# Patient Record
Sex: FEMALE | Race: WHITE | NOT HISPANIC OR LATINO | Employment: UNEMPLOYED | URBAN - NONMETROPOLITAN AREA
[De-identification: names, ages, dates, MRNs, and addresses within clinical notes are randomized per-mention and may not be internally consistent; named-entity substitution may affect disease eponyms.]

---

## 2018-07-20 ENCOUNTER — OFFICE VISIT CONVERTED (OUTPATIENT)
Dept: FAMILY MEDICINE CLINIC | Age: 63
End: 2018-07-20
Attending: NURSE PRACTITIONER

## 2018-08-06 ENCOUNTER — OFFICE VISIT CONVERTED (OUTPATIENT)
Dept: FAMILY MEDICINE CLINIC | Age: 63
End: 2018-08-06
Attending: NURSE PRACTITIONER

## 2018-08-07 ENCOUNTER — CONVERSION ENCOUNTER (OUTPATIENT)
Dept: OTHER | Facility: HOSPITAL | Age: 63
End: 2018-08-07

## 2019-02-25 ENCOUNTER — OFFICE VISIT CONVERTED (OUTPATIENT)
Dept: FAMILY MEDICINE CLINIC | Age: 64
End: 2019-02-25
Attending: FAMILY MEDICINE

## 2019-02-25 ENCOUNTER — HOSPITAL ENCOUNTER (OUTPATIENT)
Dept: OTHER | Facility: HOSPITAL | Age: 64
Discharge: HOME | End: 2019-02-25
Attending: FAMILY MEDICINE

## 2019-02-25 LAB
APPEARANCE UR: CLEAR
BACTERIA UR CULT: NORMAL
BACTERIA UR QL AUTO: ABNORMAL
BILIRUB UR QL: NEGATIVE
CASTS URNS QL MICRO: ABNORMAL /[LPF]
COLOR UR: YELLOW
CONV LEUKOCYTE ESTERASE: ABNORMAL
CONV UROBILINOGEN IN URINE BY AUTOMATED TEST STRIP: 0.2 {EHRLICHU}/DL (ref 0.1–1)
EPI CELLS #/AREA URNS HPF: ABNORMAL /[HPF]
GLUCOSE 24H UR-MCNC: NEGATIVE MG/DL
HGB UR QL STRIP: ABNORMAL
KETONES UR QL STRIP: NEGATIVE MG/DL
MUCOUS THREADS URNS QL MICRO: ABNORMAL
NITRITE UR-MCNC: NEGATIVE MG/ML
PH UR STRIP.AUTO: 6 [PH] (ref 5–8)
PROT UR-MCNC: 100 MG/DL
RBC # BLD AUTO: ABNORMAL /[HPF]
SP GR UR STRIP: 1.01 (ref 1–1.03)
SPECIMEN SOURCE: ABNORMAL
UNIDENT CRYS URNS QL MICRO: ABNORMAL /[HPF]
WBC #/AREA URNS HPF: ABNORMAL /[HPF]

## 2019-02-27 LAB
AMOXICILLIN+CLAV SUSC ISLT: 16
AMPICILLIN SUSC ISLT: >=32
AMPICILLIN+SULBAC SUSC ISLT: >=32
BACTERIA UR CULT: ABNORMAL
CEFAZOLIN SUSC ISLT: <=4
CEFEPIME SUSC ISLT: <=1
CEFTAZIDIME SUSC ISLT: <=1
CEFTRIAXONE SUSC ISLT: <=1
CEFUROXIME ORAL SUSC ISLT: 4
CEFUROXIME PARENTER SUSC ISLT: 4
CIPROFLOXACIN SUSC ISLT: <=0.25
ERTAPENEM SUSC ISLT: <=0.5
GENTAMICIN SUSC ISLT: <=1
LEVOFLOXACIN SUSC ISLT: <=0.12
NITROFURANTOIN SUSC ISLT: <=16
TETRACYCLINE SUSC ISLT: <=1
TMP SMX SUSC ISLT: <=20
TOBRAMYCIN SUSC ISLT: <=1

## 2019-06-26 ENCOUNTER — OFFICE VISIT CONVERTED (OUTPATIENT)
Dept: FAMILY MEDICINE CLINIC | Age: 64
End: 2019-06-26
Attending: NURSE PRACTITIONER

## 2019-09-11 ENCOUNTER — HOSPITAL ENCOUNTER (OUTPATIENT)
Dept: OTHER | Facility: HOSPITAL | Age: 64
Discharge: HOME | End: 2019-09-11
Attending: NURSE PRACTITIONER

## 2020-01-17 ENCOUNTER — HOSPITAL ENCOUNTER (OUTPATIENT)
Dept: OTHER | Facility: HOSPITAL | Age: 65
Discharge: HOME | End: 2020-01-17
Attending: NURSE PRACTITIONER

## 2020-01-17 ENCOUNTER — OFFICE VISIT CONVERTED (OUTPATIENT)
Dept: FAMILY MEDICINE CLINIC | Age: 65
End: 2020-01-17
Attending: NURSE PRACTITIONER

## 2020-01-17 LAB
ALBUMIN SERPL-MCNC: 4.3 G/DL (ref 3.5–5)
ALBUMIN/GLOB SERPL: 1.7 {RATIO} (ref 1.4–2.6)
ALP SERPL-CCNC: 83 U/L (ref 43–160)
ALT SERPL-CCNC: 9 U/L (ref 10–40)
ANION GAP SERPL CALC-SCNC: 16 MMOL/L (ref 8–19)
AST SERPL-CCNC: 15 U/L (ref 15–50)
BASOPHILS # BLD MANUAL: 0.04 10*3/UL (ref 0–0.2)
BASOPHILS NFR BLD MANUAL: 0.6 % (ref 0–3)
BILIRUB SERPL-MCNC: 0.4 MG/DL (ref 0.2–1.3)
BUN SERPL-MCNC: 11 MG/DL (ref 5–25)
BUN/CREAT SERPL: 13 {RATIO} (ref 6–20)
CALCIUM SERPL-MCNC: 9.1 MG/DL (ref 8.7–10.4)
CHLORIDE SERPL-SCNC: 101 MMOL/L (ref 99–111)
CONV CO2: 26 MMOL/L (ref 22–32)
CONV TOTAL PROTEIN: 6.8 G/DL (ref 6.3–8.2)
CREAT UR-MCNC: 0.86 MG/DL (ref 0.5–0.9)
DEPRECATED RDW RBC AUTO: 40.5 FL
EOSINOPHIL # BLD MANUAL: 0.11 10*3/UL (ref 0–0.7)
EOSINOPHIL NFR BLD MANUAL: 1.7 % (ref 0–7)
ERYTHROCYTE [DISTWIDTH] IN BLOOD BY AUTOMATED COUNT: 12.4 % (ref 11.5–14.5)
GFR SERPLBLD BASED ON 1.73 SQ M-ARVRAT: >60 ML/MIN/{1.73_M2}
GLOBULIN UR ELPH-MCNC: 2.5 G/DL (ref 2–3.5)
GLUCOSE SERPL-MCNC: 91 MG/DL (ref 65–99)
GRANS (ABSOLUTE): 4.07 10*3/UL (ref 2–8)
GRANS: 63.1 % (ref 30–85)
HBA1C MFR BLD: 13.3 G/DL (ref 12–16)
HCT VFR BLD AUTO: 40.4 % (ref 37–47)
IMM GRANULOCYTES # BLD: 0.02 10*3/UL (ref 0–0.54)
IMM GRANULOCYTES NFR BLD: 0.3 % (ref 0–0.43)
LYMPHOCYTES # BLD MANUAL: 1.85 10*3/UL (ref 1–5)
LYMPHOCYTES NFR BLD MANUAL: 5.7 % (ref 3–10)
MCH RBC QN AUTO: 29.3 PG (ref 27–31)
MCHC RBC AUTO-ENTMCNC: 32.9 G/DL (ref 33–37)
MCV RBC AUTO: 89 FL (ref 81–99)
MONOCYTES # BLD AUTO: 0.37 10*3/UL (ref 0.2–1.2)
OSMOLALITY SERPL CALC.SUM OF ELEC: 289 MOSM/KG (ref 273–304)
PLATELET # BLD AUTO: 251 10*3/UL (ref 130–400)
PMV BLD AUTO: 10.7 FL (ref 7.4–10.4)
POTASSIUM SERPL-SCNC: 3.3 MMOL/L (ref 3.5–5.3)
RBC # BLD AUTO: 4.54 10*6/UL (ref 4.2–5.4)
SODIUM SERPL-SCNC: 140 MMOL/L (ref 135–147)
T4 FREE SERPL-MCNC: 1.3 NG/DL (ref 0.9–1.8)
TSH SERPL-ACNC: 1.64 M[IU]/L (ref 0.27–4.2)
VARIANT LYMPHS NFR BLD MANUAL: 28.6 % (ref 20–45)
WBC # BLD AUTO: 6.46 10*3/UL (ref 4.8–10.8)

## 2020-01-31 ENCOUNTER — HOSPITAL ENCOUNTER (OUTPATIENT)
Dept: OTHER | Facility: HOSPITAL | Age: 65
Discharge: HOME | End: 2020-01-31
Attending: NURSE PRACTITIONER

## 2020-01-31 LAB — POTASSIUM SERPL-SCNC: 4.1 MMOL/L (ref 3.5–5.3)

## 2020-09-04 ENCOUNTER — OFFICE VISIT CONVERTED (OUTPATIENT)
Dept: FAMILY MEDICINE CLINIC | Age: 65
End: 2020-09-04
Attending: NURSE PRACTITIONER

## 2020-09-04 ENCOUNTER — HOSPITAL ENCOUNTER (OUTPATIENT)
Dept: OTHER | Facility: HOSPITAL | Age: 65
Discharge: HOME | End: 2020-09-04
Attending: NURSE PRACTITIONER

## 2020-09-06 LAB — SARS-COV-2 RNA SPEC QL NAA+PROBE: NOT DETECTED

## 2021-05-18 NOTE — PROGRESS NOTES
Lori Noe 1955     Office/Outpatient Visit    Visit Date: Mon, Aug 6, 2018 12:47 pm    Provider: Terrence Burgos N.P. (Assistant: Radha Velez LPN)    Location: [facility]        Electronically signed by Terrence Burgos N.P. on  08/06/2018 03:45:20 PM                             SUBJECTIVE:        CC:     Ms. Noe is a 62 year old White female.  right ear pain, cough, sinus drainage x 3 weeks. Pain scale 2 today with her ear.;         HPI:     Patient to be evaluated for ear pain and sinus congestion.   This has been a problem for the past 1 weeks.  This is an acute problem without chronic or recurrent episodes.  Her primary symptoms include dry cough, bilateral ear congestion,  facial pressure, subjective fever,  headache and nasal congestion.  She has already tried an antihistamine and steroid nasal spray, she took Cefdinir with improvement a week or two ago and felt better but now the symptoms have returned   Medical history is pertinent for allergies.          ROS:     CONSTITUTIONAL:  Negative for chills, fatigue, fever, and weight change.      E/N/T:  Positive for ear pain ( bilateral; feels full and feels like fluid is draining ), nasal congestion, hoarseness and sinus pressure.   Negative for sore throat.      CARDIOVASCULAR:  Negative for chest pain, orthopnea, paroxysmal nocturnal dyspnea and pedal edema.      RESPIRATORY:  Positive for recent cough ( with scant amounts of purulent sputum ).   Negative for dyspnea.      GASTROINTESTINAL:  Negative for abdominal pain, constipation, diarrhea, nausea and vomiting.      PSYCHIATRIC:  Negative for anxiety, depression, and sleep disturbances.          PMH/FM/:     Last Reviewed on 8/06/2018 03:43 PM by Terrence Burgos    Past Medical History:                 PAST MEDICAL HISTORY     UNREMARKABLE     Fracture(s): left foot 4 places 2005 bilateral wrists age 28;         PREVENTIVE HEALTH MAINTENANCE              MAMMOGRAM: was last done over 5 years with normal results needs repeat     PAP SMEAR: was last done S/P Hysterectomy age 36 with normal results No longer indicated due to age and history         Surgical History:         : X 2;     Hysterectomy: partial; age 36 has one ovary      Salivary Gland - L Neck;    Skin Cancer - Back - NOT melanoma;         Family History:         Positive for Cancer- type not specified ( mother ).  Father: Medical history unknown     Mother: Cause of death was lung and breast cancer         Social History:     Occupation: Homemaker     Marital Status:      Children: 3 children         Tobacco/Alcohol/Supplements:     Last Reviewed on 2018 03:43 PM by Terrence Burgos    Tobacco: She has a past history of cigarette smoking; quit date:  Quit  2014.   Uses E-cigs Non-drinker             Current Problems:     Last Reviewed on 2018 03:43 PM by Terrence Burgos    Nasal lesion     Low back pain     Vision problems     Screening mammogram - other     Acute sinusitis, other     Acute sinusitis, unspecified         Immunizations:     zzFluzone pf-quadrivalent 3 and up 9/10/2015     zzFluzone pf-quadrivalent 3 and up 10/6/2016     Flulaval 2011     Fluzone pf (3+ years dose) 2013     Fluzone pf (3+ years dose) 10/17/2014     Fluzone pf (3+ years dose) 10/4/2017         Allergies:     Last Reviewed on 2018 03:43 PM by Terrence Burgos      No Known Drug Allergies.         Current Medications:     Last Reviewed on 2018 03:43 PM by Terrence Burgos    Aspirin (ASA) 81mg Tablets, Enteric Coated 1 tab daily     Flonase Nasal Spray     Zyrtec 10mg Tablet         OBJECTIVE:        Vitals:         Current: 2018 12:50:25 PM    Ht:  5 ft, 6.875 in;  Wt: 140.8 lbs;  BMI: 22.1    T: 98 F (oral);  BP: 105/69 mm Hg (left arm, standing);  P: 94 bpm (left arm (BP Cuff), standing);  sCr: 0.83 mg/dL;  GFR: 71.96    O2 Sat: 98 %        Exams:     PHYSICAL  EXAM:     GENERAL: vital signs recorded - well developed, well nourished;  no apparent distress;     E/N/T: EARS: both TMs are Clear fluid behind them;  NOSE: bilateral maxillary sinus tenderness present;     NECK: range of motion is normal; thyroid is non-palpable;     RESPIRATORY: normal respiratory rate and pattern with no distress; normal breath sounds with no rales, rhonchi, wheezes or rubs;     CARDIOVASCULAR: normal rate; rhythm is regular;  no systolic murmur; no edema;     GASTROINTESTINAL: nontender; normal bowel sounds; no organomegaly;     NEUROLOGICAL:  cranial nerves, motor and sensory function, reflexes, gait and coordination are all intact;     PSYCHIATRIC:  appropriate affect and demeanor; normal speech pattern; grossly normal memory;         ASSESSMENT:           461.8   J01.90  Acute sinusitis, other              DDx:     V76.12   Z12.31  Screening mammogram - other              DDx:         ORDERS:         Meds Prescribed:       Amoxicillin 875mg Tablet One PO BID X 10 days.  #20 (Twenty) tablet(s) Refills: 0       Guaifenesin 400mg Tablets, Immediate Release 2 po BID  #40 (Forty) tablet(s) Refills: 0       Nasonex (Mometasone Furoate) 50mcg/1actuation Nasal Spray 1 spray(s) in each nostril daily  #17 (Seventeen) gm Refills: 0         Radiology/Test Orders:       [id number]  Screening mammography, bilateral (2-view film study of each breast)  (Send-Out)           Lab Orders:       APPTO  Appointment need  (In-House)                   PLAN:          Acute sinusitis, other         FOLLOW-UP: Schedule follow-up appointments on a p.r.n. basis. Schedule a follow-up visit in 3 months..            Prescriptions:       Amoxicillin 875mg Tablet One PO BID X 10 days.  #20 (Twenty) tablet(s) Refills: 0       Guaifenesin 400mg Tablets, Immediate Release 2 po BID  #40 (Forty) tablet(s) Refills: 0       Nasonex (Mometasone Furoate) 50mcg/1actuation Nasal Spray 1 spray(s) in each nostril daily  #17 (Seventeen) gm  Refills: 0           Orders:       APPTO  Appointment need  (In-House)            Screening mammogram - other         FOLLOW-UP TESTING #1:    RADIOLOGY:  I have ordered screening mammogram to be done today.            Orders:       [id number]  Screening mammography, bilateral (2-view film study of each breast)  (Send-Out)               Patient Recommendations:        For  Acute sinusitis, other:     Schedule follow-up appointments as needed. Schedule a follow-up visit in 3 months.                APPOINTMENT INFORMATION:        Monday Tuesday Wednesday Thursday Friday Saturday Sunday            Time:___________________AM  PM   Date:_____________________             CHARGE CAPTURE:           Primary Diagnosis:     461.8 Acute sinusitis, other            J01.90    Acute sinusitis, unspecified              Orders:          [id number]   Office/outpatient visit; established patient, level 3  (In-House)             APPTO   Appointment need  (In-House)           V76.12 Screening mammogram - other            Z12.31    Encounter for screening mammogram for malignant neoplasm of breast

## 2021-05-18 NOTE — PROGRESS NOTES
Lori Noe 1955     Office/Outpatient Visit    Visit Date:  10:52 am    Provider: Manuel Mcdaniel MD (Assistant: Sarah Spurling, MA)    Location: [facility]        Electronically signed by Manuel Mcdaniel MD on  2019 02:42:34 PM                             SUBJECTIVE:        CC:     Ms. Noe is a 63 year old White female.  Blood in urine. Noticed it this morning.;         HPI:         Ms. Noe presents with hematuria, unspecified .  This began yesterday.  Associated symptoms include hematuria, urgency and urinary frequency.      ROS:     CONSTITUTIONAL:  Negative for chills and fever.      GASTROINTESTINAL:  Negative for abdominal pain, constipation, diarrhea, nausea and vomiting.      GENITOURINARY:  Negative for vaginal discharge and vaginal itching.      MUSCULOSKELETAL:  Negative for myalgias.          [facility]/:     Last Reviewed on 2019 11:08 AM by Manuel Mcdaniel    Past Medical History:                 PAST MEDICAL HISTORY     UNREMARKABLE     Fracture(s): left foot 4 places 2005 bilateral wrists age 28;         PREVENTIVE HEALTH MAINTENANCE             MAMMOGRAM: Done within last 2 years and results in are chart was last done 2018 with normal results     PAP SMEAR: was last done S/P Hysterectomy age 36 with normal results No longer indicated due to age and history         Surgical History:         : X 2;     Hysterectomy: partial; age 36 has one ovary      Salivary Gland - L Neck;    Skin Cancer - Back - NOT melanoma;         Family History:         Positive for Cancer- type not specified ( mother ).  Father: Medical history unknown     Mother: Cause of death was lung and breast cancer         Social History:     Occupation: Homemaker     Marital Status:      Children: 3 children         Tobacco/Alcohol/Supplements:     Last Reviewed on 2019 11:07 AM by Manuel Mcdaniel    Tobacco: She has a past history  of cigarette smoking; quit date:  Quit  july 2014.   Uses E-cigs Non-drinker         Substance Abuse History:     Last Reviewed on 2/25/2019 11:07 AM by Manuel Mcdaniel    NEGATIVE         Mental Health History:     Last Reviewed on 8/06/2018 03:43 PM by Terrence Burgos        Communicable Diseases (eg STDs):     Last Reviewed on 8/06/2018 03:43 PM by Terrence Burgos            Current Problems:     Last Reviewed on 2/25/2019 11:09 AM by Manuel Mcdaniel    Hematuria, unspecified     Nasal lesion     Vision problems         Immunizations:     Hep A, adult dose 12/11/2018     zzFluzone pf-quadrivalent 3 and up 9/10/2015     zzFluzone pf-quadrivalent 3 and up 10/6/2016     Flulaval 9/7/2011     Fluzone pf (3+ years dose) 9/27/2013     Fluzone pf (3+ years dose) 10/17/2014     Fluzone pf (3+ years dose) 10/4/2017     Fluzone Quadrivalent (3+ years) 10/24/2018         Allergies:     Last Reviewed on 2/25/2019 11:07 AM by Manuel Mcdaniel      No Known Drug Allergies.         Current Medications:     Last Reviewed on 2/25/2019 11:08 AM by Manuel Mcdaniel    Aspirin (ASA) 81mg Tablets, Enteric Coated 1 tab daily     Flonase Nasal Spray     Zyrtec 10mg Tablet         OBJECTIVE:        Vitals:         Current: 2/25/2019 11:02:04 AM    Ht:  5 ft, 6.875 in;  Wt: 140 lbs;  BMI: 22.0    T: 97.7 F (oral);  BP: 115/70 mm Hg (left arm, standing);  P: 97 bpm (left arm (BP Cuff), standing);  sCr: 0.83 mg/dL;  GFR: 70.92        Exams:     PHYSICAL EXAM:     GENERAL: vital signs recorded - well developed, well nourished;  no apparent distress;     RESPIRATORY: normal respiratory rate and pattern with no distress; normal breath sounds with no rales, rhonchi, wheezes or rubs;     CARDIOVASCULAR: normal rate; rhythm is regular;  no systolic murmur; no edema;     GASTROINTESTINAL: nontender; normal bowel sounds; no organomegaly;     MUSCULOSKELETAL: MILD LEFT CVA TENDERNESS;     NEUROLOGIC: GROSSLY  INTACT         Lab/Test Results:             Protein urine screen:  100 (mg/dl) (02/25/2019),     blood urine:  LARGE (NA) (02/25/2019),     Leukoctyes, urine:  MODERATE (NA) (02/25/2019),     WBC, urine:  TNTC (/HPF) (02/25/2019),     RBC, urine:  TNTC (/HPF) (02/25/2019),     Bacteria:  1+ (NA) (02/25/2019),             ASSESSMENT           595.0   N30.01  Acute cystitis POSSIBLE EARLY PYELONEPHRITIS              DDx:         ORDERS:         Meds Prescribed:       Cipro (Ciprofloxacin HCl) 500mg Tablet one tab BID  #14 (Fourteen) tablet(s) Refills: 0       Pyridium (Phenazopyridine HCl) 200mg Tablet Take 1 tablet(s) by mouth tid prn  #12 (Twelve) tablet(s) Refills: 0         Lab Orders:       [id number]  [facility] - H Urinalysis, automated, with micro  (Send-Out)                   PLAN:          Acute cystitis         RECOMMENDATIONS given include: increase oral fluid intake and GO TO ER IF DEVELOPS INCRASING BACK PAIN, N/V OR WORSENING ABD PAIN.      FOLLOW-UP: Schedule follow-up appointments on a p.r.n. basis. AND BY PHONE IN A FEW DAYS.            Prescriptions:       Cipro (Ciprofloxacin HCl) 500mg Tablet one tab BID  #14 (Fourteen) tablet(s) Refills: 0       Pyridium (Phenazopyridine HCl) 200mg Tablet Take 1 tablet(s) by mouth tid prn  #12 (Twelve) tablet(s) Refills: 0           Orders:       [id number]  [facility] - [facility] Urinalysis, automated, with micro  (Send-Out)               Patient Recommendations:        For  Acute cystitis:     Increase your intake of oral fluids.  Schedule follow-up appointments as needed.              CHARGE CAPTURE           **Please note: ICD descriptions below are intended for billing purposes only and may not represent clinical diagnoses**        Primary Diagnosis:         595.0 Acute cystitis            N30.01    Acute cystitis with hematuria              Orders:          [id number]   Office/outpatient visit; established patient, level 3  (In-House)

## 2021-05-18 NOTE — PROGRESS NOTES
Lori Noe  1955     Office/Outpatient Visit    Visit Date: Fri, Sep 4, 2020 10:42 am    Provider: Marisa Syed N.P. (Assistant: Claribel Perkins LPN)    Location: [facility]        Electronically signed by Marisa Syed N.P. on  09/08/2020 09:47:48 AM                             Subjective:        CC: Ms. Noe is a 64 year old White female.  Diarrhea, nausea ears hurt;         HPI:           Complaint of nausea..  The symptom began yesterday.  nausea  started the same time her son - thought may have been from what eating           Complaint of otalgia, bilateral..  The symptom began yesterday.  This is the first episode of this type of pain.  does have chronic allergy / sinus problems using flonase and zyrtec PRN           In regard to the diarrhea, unspecified, this has been a problem for the past one to two days.  just started this morning   - thinks may have been from what she ate yesterday - onions and sour cream     ROS:     CONSTITUTIONAL:  Positive for chills.   Negative for fever.      E/N/T:  Positive for ear pain ( bilateral ), sinus pressure and sneezing.      CARDIOVASCULAR:  Negative for chest pain, orthopnea, paroxysmal nocturnal dyspnea and pedal edema.      RESPIRATORY:  Positive for recent cough.   Negative for dyspnea.      GASTROINTESTINAL:  Positive for diarrhea and nausea.   Negative for constipation or vomiting.      NEUROLOGICAL:  Negative for dizziness, headaches, paresthesias, and weakness.      PSYCHIATRIC:  Negative for anxiety, depression, and sleep disturbances.          Past Medical History / Family History / Social History:         Last Reviewed on 1/17/2020 01:20 PM by Terrence Burgos    Past Medical History:                 PAST MEDICAL HISTORY     UNREMARKABLE     Fracture(s): left foot 4 places 2005 bilateral wrists age 28;         PREVENTIVE HEALTH MAINTENANCE             COLORECTAL CANCER SCREENING: declines     MAMMOGRAM: Done  within last 2 years and results in are chart was last done 2019 with normal results     PAP SMEAR: was last done S/P Hysterectomy age 36 with normal results No longer indicated due to age and history         Surgical History:         : X 2;     Hysterectomy: partial; age 36 has one ovary     Salivary Gland - L Neck;    Skin Cancer - Back - NOT melanoma;         Family History:         Positive for Cancer- type not specified ( mother ).  Father: Medical history unknown     Mother: Cause of death was lung and breast cancer         Social History:     Occupation: Homemaker     Marital Status:      Children: 3 children         Tobacco/Alcohol/Supplements:     Last Reviewed on 2020 01:20 PM by Terrence Burgos    Tobacco: She has a past history of cigarette smoking; quit date:  Quit  2014.   Uses E-cigs Non-drinker         Substance Abuse History:     Last Reviewed on 2020 01:20 PM by Terrence Burgos    NEGATIVE         Mental Health History:     Last Reviewed on 2020 01:20 PM by Terrence Burgos        Communicable Diseases (eg STDs):     Last Reviewed on 2020 01:20 PM by Terrence Burgos        Current Problems:     Last Reviewed on 2020 01:20 PM by Terrence Burgos    Vision problems    Nasal lesion    Unspecified disorder of nose and nasal sinuses    Screening for depression    Encounter for screening for depression    Localized swelling, mass and lump, neck    Hypokalemia        Immunizations:     Hep A, adult dose 2018    Hep A, adult dose 2019    zzFluzone pf-quadrivalent 3 and up 9/10/2015    zzFluzone pf-quadrivalent 3 and up 10/6/2016    Flulaval 2011    Fluzone pf (3+ years dose) 2013    Fluzone pf (3+ years dose) 10/17/2014    Fluzone pf (3+ years dose) 10/4/2017    Fluzone Quadrivalent (3+ years) 10/24/2018    Influenza quadrivalent, recombinant, pf 10/4/2019    Boostrix (Tdap) 10/4/2019        Allergies:     Last Reviewed on  1/17/2020 01:20 PM by Terrence Burgos    No Known Allergies.        Current Medications:     Last Reviewed on 1/17/2020 01:20 PM by Terrence Burgos    Zyrtec 10mg Tablet [1 tab daily PRN]    Flonase Nasal Spray as needed    Aspirin (ASA) 81mg Tablets, Enteric Coated [1 tab daily]    Multivitamins  Tablet [1 tab daily]        Objective:        Vitals:         Current: 9/4/2020 10:45:55 AM    Ht:  5 ft, 6.875 in;  Wt: 146.6 lbs;  BMI: 23.0T: 97.1 F (oral);  BP: 121/75 mm Hg (left arm, sitting);  P: 99 bpm (left arm (BP Cuff), sitting);  sCr: 0.86 mg/dL;  GFR: 68.94        Exams:     PHYSICAL EXAM:     GENERAL: vital signs recorded - well developed, well nourished;  no apparent distress, appears minimally ill;     E/N/T: EARS: both TMs are dull;     RESPIRATORY: normal respiratory rate and pattern with no distress; normal breath sounds with no rales, rhonchi, wheezes or rubs;     CARDIOVASCULAR: normal rate; rhythm is regular;  no systolic murmur; no edema;     GASTROINTESTINAL: nontender; normal bowel sounds;     LYMPHATIC: no enlargement of cervical or facial nodes;     NEUROLOGICAL:  cranial nerves, motor and sensory function, reflexes, gait and coordination are all intact;     PSYCHIATRIC:  appropriate affect and demeanor; normal speech pattern; grossly normal memory;         Assessment:         R11.0   Nausea       H92.03   Otalgia, bilateral       R19.7   Diarrhea, unspecified           ORDERS:         Radiology/Test Orders:       [id number]  COVID 19 Testing  (Send-Out)                      Plan:         Nauseadeclines medication at this time  - follow up if worsens or symptoms change        Otalgia, bilateralfeel this is due to allergies and ETD - instructed to continue with flonase, modified valsalva to help with ETD and follow up if no improvement or worsening.          Diarrhea, unspecified    LABORATORY:  Labs ordered to be performed today include COVID 19 Testing.            Orders:       [id number]  COVID 19  Testing  (Send-Out)                  Charge Capture:         Primary Diagnosis:     R11.0  Nausea           Orders:      [id number]  Office/outpatient visit; established patient, level 3  (In-House)              H92.03  Otalgia, bilateral     R19.7  Diarrhea, unspecified

## 2021-05-18 NOTE — PROGRESS NOTES
Lori Noe 1955     Office/Outpatient Visit    Visit Date:  09:52 am    Provider: Hermelinda Olvera N.P. (Assistant: Zaynab Platt RN)    Location: [facility]        Electronically signed by Hermelinda Olvera N.P. on  2019 10:30:06 AM                             SUBJECTIVE:        CC:     Ms. Noe is a 63 year old White female.  presents today due to Pt  states that she has had sinus  pressure, green snot, and bilateral ear pressure, left worse than right, and this has been going on for about  4 days.          HPI:         Upper respiratory illness details; these have been present for the past 4 days.  The symptoms include ear complaints, headache, nasal congestion and sinus pain/pressure.  She denies fever.  She has already tried to relieve the symptoms with Tylenol.  (Not using Flonase and Zyrtec)     ROS:     CONSTITUTIONAL:  Negative for chills and fever.      EYES:  Negative for eye drainage and eye pain.      E/N/T:  Positive for ear pain, nasal congestion and sinus pressure.   Negative for sore throat.      CARDIOVASCULAR:  Negative for chest pain and palpitations.      RESPIRATORY:  Negative for dyspnea and frequent wheezing.      GASTROINTESTINAL:  Negative for abdominal pain and vomiting.          PMH/FMH/SH:     Last Reviewed on 2019 11:08 AM by Manuel Mcdaniel    Past Medical History:                 PAST MEDICAL HISTORY     UNREMARKABLE     Fracture(s): left foot 4 places 2005 bilateral wrists age 28;         PREVENTIVE HEALTH MAINTENANCE             MAMMOGRAM: Done within last 2 years and results in are chart was last done 2018 with normal results     PAP SMEAR: was last done S/P Hysterectomy age 36 with normal results No longer indicated due to age and history         Surgical History:         : X 2;     Hysterectomy: partial; age 36 has one ovary      Salivary Gland - L Neck;    Skin Cancer - Back - NOT melanoma;          Family History:         Positive for Cancer- type not specified ( mother ).  Father: Medical history unknown     Mother: Cause of death was lung and breast cancer         Social History:     Occupation: Homemaker     Marital Status:      Children: 3 children         Tobacco/Alcohol/Supplements:     Last Reviewed on 6/26/2019 10:00 AM by Zaynab Platt    Tobacco: She has a past history of cigarette smoking; quit date:  Quit  july 2014.   Uses [employer] Non-drinker         Substance Abuse History:     Last Reviewed on 2/25/2019 11:07 AM by Manuel Mcdaniel    NEGATIVE         Mental Health History:     Last Reviewed on 8/06/2018 03:43 PM by Terrence Burgos        Communicable Diseases (eg STDs):     Last Reviewed on 8/06/2018 03:43 PM by Terrence Burgos            Current Problems:     Last Reviewed on 2/25/2019 11:09 AM by Manuel Mcdaniel    Nasal lesion     Vision problems         Immunizations:     Hep A, adult dose 12/11/2018     Hep A, adult dose 6/13/2019     zzFluzone pf-quadrivalent 3 and up 9/10/2015     zzFluzone pf-quadrivalent 3 and up 10/6/2016     Flulaval 9/7/2011     Fluzone pf (3+ years dose) 9/27/2013     Fluzone pf (3+ years dose) 10/17/2014     Fluzone pf (3+ years dose) 10/4/2017     Fluzone Quadrivalent (3+ years) 10/24/2018         Allergies:     Last Reviewed on 6/26/2019 09:59 AM by Zaynab Platt      No Known Drug Allergies.         Current Medications:     Last Reviewed on 6/26/2019 10:00 AM by Zaynab Platt    Aspirin (ASA) 81mg Tablets, Enteric Coated 1 tab daily     Flonase Nasal Spray as needed     Zyrtec 10mg Tablet 1 tab daily PRN     Multivitamins Tablet 1 tab daily         OBJECTIVE:        Vitals:         Current: 6/26/2019 9:57:18 AM    Ht:  5 ft, 6.875 in;  Wt: 142.4 lbs;  BMI: 22.4    T: 97.8 F (oral);  BP: 103/67 mm Hg (left arm, standing);  P: 73 bpm (left arm (BP Cuff), standing);  sCr: 0.83 mg/dL;  GFR: 71.43        Exams:      PHYSICAL EXAM:     GENERAL: well developed, well nourished;  no apparent distress;     EYES: PERRL, EOMI     E/N/T: EARS: external auditory canal normal;  both TMs are have fluid behind them;  NOSE: turbinates are mildly swollen bilaterally;  bilateral maxillary sinus tenderness present; OROPHARYNX: oral mucosa is normal; posterior pharynx shows no exudate;     NECK: range of motion is normal; trachea is midline;     RESPIRATORY: normal respiratory rate and pattern with no distress; normal breath sounds with no rales, rhonchi, wheezes or rubs;     CARDIOVASCULAR: normal rate; rhythm is regular;     MUSCULOSKELETAL: normal gait;     NEUROLOGIC: mental status: alert and oriented x 3; GROSSLY INTACT     PSYCHIATRIC: appropriate affect and demeanor;         ASSESSMENT           465.9   J00  Upper respiratory infection              DDx:     461.8   J01.90  Acute sinusitis, other              DDx:     V79.0   Z13.31  Screening for depression              DDx:         ORDERS:         Meds Prescribed:       Amoxicillin 875mg Tablet One PO BID X 10 days.  #20 (Twenty) tablet(s) Refills: 0         Other Orders:         Depression screen negative  (In-House)           Negative EtOH screen  (In-House)                   PLAN:          Upper respiratory infection Recommend restarting allergy medication.         RECOMMENDATIONS given include: Push Fluids, Rest, Follow up if no improvement or worsening symptoms like high fevers, vomiting, weakness, or increasing shortness of air.    .      FOLLOW-UP: Chronic visit follow up          Acute sinusitis, other If no improvement with conservative treatment in time frame discussed, will start antibiotic treatment. Finish entire course if antibiotic started.           Prescriptions:       Amoxicillin 875mg Tablet One PO BID X 10 days.  #20 (Twenty) tablet(s) Refills: 0          Screening for depression     MIPS PHQ-9 Depression Screening: Completed form scanned and in chart;  Total Score 1; Negative Depression Screen Negative alcohol screen           Orders:         Depression screen negative  (In-House)           Negative EtOH screen  (In-House)               CHARGE CAPTURE           **Please note: ICD descriptions below are intended for billing purposes only and may not represent clinical diagnoses**        Primary Diagnosis:         465.9 Upper respiratory infection            J00    Acute nasopharyngitis [common cold]              Orders:          71602   Office/outpatient visit; established patient, level 3  (In-House)           461.8 Acute sinusitis, other            J01.90    Acute sinusitis, unspecified    V79.0 Screening for depression            Z13.31    Encounter for screening for depression              Orders:             Depression screen negative  (In-House)                Negative EtOH screen  (In-House)

## 2021-05-18 NOTE — PROGRESS NOTES
Lori Noe 1955     Office/Outpatient Visit    Visit Date:  09:39 am    Provider: Terrence Burgos N.P. (Assistant: Linda Cunningham MA)    Location: [facility]        Electronically signed by Terrence Burgos N.P. on  2018 11:14:25 AM                             SUBJECTIVE:        CC:     Ms. Noe is a 62 year old White female.  Earache/ Congestion;         HPI:         Patient to be evaluated for acute sinusitis, unspecified.  This has been a problem for the past 1.5 weeks.  This is an acute problem without chronic or recurrent episodes.  Her primary symptoms include dry cough, bilateral ear congestion,  facial pressure, subjective fever,  headache and nasal congestion.  She has already tried an antihistamine and steroid nasal spray.  Medical history is pertinent for allergies.      ROS:     CONSTITUTIONAL:  Positive for chills and fever.      E/N/T:  Positive for ear pain ( bilateral ), nasal congestion and sinus pressure.   Negative for sore throat.      CARDIOVASCULAR:  Negative for chest pain, orthopnea, paroxysmal nocturnal dyspnea and pedal edema.      RESPIRATORY:  Positive for recent cough ( typically dry ).   Negative for dyspnea.      GASTROINTESTINAL:  Negative for abdominal pain, constipation, diarrhea, nausea and vomiting.          PMH/FMH/SH:     Last Reviewed on 2018 10:10 AM by Terrence Burgos    Past Medical History:                 PAST MEDICAL HISTORY     UNREMARKABLE     Fracture(s): left foot 4 places  bilateral wrists age 28;         PREVENTIVE HEALTH MAINTENANCE             INFLUENZA VACCINE: was last done 2015         Surgical History:         : X 2;     Hysterectomy: partial; age 36 has one ovary      Salivary Gland - L Neck;    Skin Cancer - Back - NOT melanoma;         Family History:         Positive for Cancer- type not specified ( mother ).  Father: Medical history unknown     Mother: Cause of death  was lung and breast cancer         Social History:     Occupation: Homemaker     Marital Status:      Children: 3 children         Tobacco/Alcohol/Supplements:     Last Reviewed on 7/20/2018 10:10 AM by Terrence Burgos    Tobacco: She has a past history of cigarette smoking; quit date:  Quit  july 2014.   Uses [employer] Non-drinker             Current Problems:     Last Reviewed on 7/20/2018 10:10 AM by Terrence Burgos    Nasal lesion     Low back pain     Vision problems         Immunizations:     zzFluzone pf-quadrivalent 3 and up 9/10/2015     zzFluzone pf-quadrivalent 3 and up 10/6/2016     Flulaval 9/7/2011     Fluzone pf (3+ years dose) 9/27/2013     Fluzone pf (3+ years dose) 10/17/2014     Fluzone pf (3+ years dose) 10/4/2017         Allergies:     Last Reviewed on 7/20/2018 10:10 AM by Terrence Burgos      No Known Drug Allergies.         Current Medications:     Last Reviewed on 7/20/2018 10:10 AM by Terrence Burgos    Aspirin (ASA) 81mg Tablets, Enteric Coated 1 tab daily     Flonase Nasal Spray     Zyrtec 10mg Tablet         OBJECTIVE:        Vitals:         Current: 7/20/2018 9:39:51 AM    Ht:  5 ft, 6.875 in;  Wt: 142 lbs;  BMI: 22.3    T: 97.3 F (oral);  BP: 119/77 mm Hg (left arm, standing);  P: 82 bpm (left arm (BP Cuff), standing);  sCr: 0.83 mg/dL;  GFR: 72.22        Exams:     PHYSICAL EXAM:     GENERAL: vital signs recorded - well developed, well nourished;  no apparent distress;     E/N/T: NOSE: left maxillary and left frontal sinus tenderness present;     NECK: range of motion is normal; thyroid is non-palpable;     RESPIRATORY: normal respiratory rate and pattern with no distress; normal breath sounds with no rales, rhonchi, wheezes or rubs;     CARDIOVASCULAR: normal rate; rhythm is regular;  no systolic murmur; no edema;     GASTROINTESTINAL: nontender; normal bowel sounds; no organomegaly;         ASSESSMENT:           461.9   J01.90  Acute sinusitis, unspecified               DDx:         ORDERS:         Meds Prescribed:       Cefdinir 300mg Capsules 1 bid for 10 days  #20 (Twenty) capsule(s) Refills: 0                 PLAN:          Acute sinusitis, unspecified         FOLLOW-UP: Schedule follow-up appointments on a p.r.n. basis..            Prescriptions:       Cefdinir 300mg Capsules 1 bid for 10 days  #20 (Twenty) capsule(s) Refills: 0           Patient Education Handouts:       Sinus Infection (Sinusitis)              Patient Recommendations:        For  Acute sinusitis, unspecified:     Schedule follow-up appointments as needed.                APPOINTMENT INFORMATION:        Monday Tuesday Wednesday Thursday Friday Saturday Sunday            Time:___________________AM  PM   Date:_____________________             CHARGE CAPTURE:           Primary Diagnosis:     461.9 Acute sinusitis, unspecified            J01.90    Acute sinusitis, unspecified              Orders:          [id number]   Office/outpatient visit; established patient, level 3  (In-House)

## 2021-05-18 NOTE — PROGRESS NOTES
Lori Noe  1955     Office/Outpatient Visit    Visit Date:  10:23 am    Provider: Terrence Burgos N.P. (Assistant: Gemini Hillman MA)    Location: [facility]        Electronically signed by Terrence Burgos N.P. on  2020 01:23:06 PM                             Subjective:        CC: Ms. Noe is a 64 year old White female.  She presents with lump on throat.          HPI:       Here with lump on left side of neck ,  been there for at least one week, does not hurt, denies fever, cough, sinus pressure or pain, or recent URI. Does have hx of allergies that she takes PRN Zyrtec for, took 1/2 tablet zyrtec last pm that seems to helped some.    ROS:     CONSTITUTIONAL:  Negative for chills, fatigue, fever, and weight change.      CARDIOVASCULAR:  Negative for chest pain, orthopnea, paroxysmal nocturnal dyspnea and pedal edema.      RESPIRATORY:  Negative for dyspnea.      GASTROINTESTINAL:  Negative for abdominal pain, constipation, diarrhea, nausea and vomiting.      HEMATOLOGIC/LYMPHATIC:  Positive for Swollen area left side of neck.      PSYCHIATRIC:  Negative for anxiety, depression, and sleep disturbances.          Past Medical History / Family History / Social History:         Last Reviewed on 2020 01:20 PM by Terrence Burgos    Past Medical History:                 PAST MEDICAL HISTORY     UNREMARKABLE     Fracture(s): left foot 4 places 2005 bilateral wrists age 28;         PREVENTIVE HEALTH MAINTENANCE             MAMMOGRAM: Done within last 2 years and results in are chart was last done 2019 with normal results     PAP SMEAR: was last done S/P Hysterectomy age 36 with normal results No longer indicated due to age and history         Surgical History:         : X 2;     Hysterectomy: partial; age 36 has one ovary     Salivary Gland - L Neck;    Skin Cancer - Back - NOT melanoma;         Family History:         Positive for  Cancer- type not specified ( mother ).  Father: Medical history unknown     Mother: Cause of death was lung and breast cancer         Social History:     Occupation: Homemaker     Marital Status:      Children: 3 children         Tobacco/Alcohol/Supplements:     Last Reviewed on 1/17/2020 01:20 PM by Terrence Burgos    Tobacco: She has a past history of cigarette smoking; quit date:  Quit  july 2014.   Uses [employer] Non-drinker         Substance Abuse History:     Last Reviewed on 1/17/2020 01:20 PM by Terrence Burgos    NEGATIVE         Mental Health History:     Last Reviewed on 1/17/2020 01:20 PM by Terrence Burgos        Communicable Diseases (eg STDs):     Last Reviewed on 1/17/2020 01:20 PM by Terrence Burgos        Current Problems:     Last Reviewed on 1/17/2020 01:20 PM by Terrence Burgos    Unspecified disorder of nose and nasal sinuses    Encounter for screening for depression    Localized swelling, mass and lump, neck        Immunizations:     Hep A, adult dose 12/11/2018    Hep A, adult dose 6/13/2019    zzFluzone pf-quadrivalent 3 and up 9/10/2015    zzFluzone pf-quadrivalent 3 and up 10/6/2016    Flulaval 9/7/2011    Fluzone pf (3+ years dose) 9/27/2013    Fluzone pf (3+ years dose) 10/17/2014    Fluzone pf (3+ years dose) 10/4/2017    Fluzone Quadrivalent (3+ years) 10/24/2018    Influenza quadrivalent, recombinant, pf 10/4/2019    Boostrix (Tdap) 10/4/2019        Allergies:     Last Reviewed on 1/17/2020 01:20 PM by Terrence Burgos    No Known Allergies.        Current Medications:     Last Reviewed on 1/17/2020 01:20 PM by Terrence Burgos    Zyrtec 10mg Tablet [1 tab daily PRN]    Flonase Nasal Spray as needed    Aspirin (ASA) 81mg Tablets, Enteric Coated [1 tab daily]    Multivitamins  Tablet [1 tab daily]        Objective:        Vitals:         Current: 1/17/2020 10:31:05 AM    Ht:  5 ft, 6.875 in;  Wt: 143.8 lbs;  BMI: 22.6T: 97.5 F (oral);  BP: 111/70 mm Hg (left arm,  standing);  P: 83 bpm (left arm (BP Cuff), standing);  sCr: 0.83 mg/dL;  GFR: 70.85        Exams:     PHYSICAL EXAM:     GENERAL: vital signs recorded - well developed, well nourished;  no apparent distress;     NECK: Semi firm , mobile , nontender mass left cervical area without erythema;     RESPIRATORY: normal respiratory rate and pattern with no distress; normal breath sounds with no rales, rhonchi, wheezes or rubs;     CARDIOVASCULAR: normal rate; rhythm is regular;  no systolic murmur; no edema;     GASTROINTESTINAL: nontender; normal bowel sounds; no organomegaly;     NEUROLOGIC: GROSSLY INTACT     PSYCHIATRIC:  appropriate affect and demeanor; normal speech pattern; grossly normal memory;         Assessment:         R22.1   Localized swelling, mass and lump, neck           ORDERS:         Radiology/Test Orders:       [id number]  Radiologic examination; neck, soft tissue  (Send-Out)              Lab Orders:       [id number]  BDCB - [facility] CBC with 3 part diff  (Send-Out)            [id number]  COMP - [facility] Comp. Metabolic Panel  (Send-Out)            93220  THY - [facility] Thyroid panel with TSH ([id number], 84575)  (Send-Out)                      Plan:         Localized swelling, mass and lump, neck    LABORATORY:  Labs ordered to be performed today include CBC, Comprehensive metabolic panel, Thyroid Panel, and.      RECOMMENDATIONS given include: Further recommendation to be given after test results are complete.      FOLLOW-UP: Schedule follow-up appointments on a p.r.n. basis.:.      FOLLOW-UP TESTING #1:    RADIOLOGY:  I have ordered a neck soft tissue x-ray to be done today.            Orders:       [id number]  BDCBC - [facility] CBC with 3 part diff  (Send-Out)            [id number]  COMP - [facility] Comp. Metabolic Panel  (Send-Out)            93999  THYII - [facility] Thyroid panel with TSH ([id number], 07364)  (Send-Out)            49354  Radiologic examination; neck, soft tissue  (Send-Out)                  Patient Recommendations:        For  Localized  swelling, mass and lump, neck:    Schedule follow-up appointments as needed.                APPOINTMENT INFORMATION:        Monday Tuesday Wednesday Thursday Friday Saturday Sunday            Time:___________________AM  PM   Date:_____________________             Charge Capture:         Primary Diagnosis:     R22.1  Localized swelling, mass and lump, neck           Orders:      [id number]  Office/outpatient visit; established patient, level 3  (In-House)

## 2021-07-01 VITALS
BODY MASS INDEX: 22.1 KG/M2 | OXYGEN SATURATION: 98 % | HEIGHT: 67 IN | TEMPERATURE: 98 F | SYSTOLIC BLOOD PRESSURE: 105 MMHG | DIASTOLIC BLOOD PRESSURE: 69 MMHG | WEIGHT: 140.8 LBS | HEART RATE: 94 BPM

## 2021-07-01 VITALS
DIASTOLIC BLOOD PRESSURE: 77 MMHG | SYSTOLIC BLOOD PRESSURE: 119 MMHG | TEMPERATURE: 97.3 F | HEIGHT: 67 IN | WEIGHT: 142 LBS | BODY MASS INDEX: 22.29 KG/M2 | HEART RATE: 82 BPM

## 2021-07-01 VITALS
WEIGHT: 140 LBS | TEMPERATURE: 97.7 F | HEART RATE: 97 BPM | SYSTOLIC BLOOD PRESSURE: 115 MMHG | DIASTOLIC BLOOD PRESSURE: 70 MMHG | BODY MASS INDEX: 21.97 KG/M2 | HEIGHT: 67 IN

## 2021-07-01 VITALS
HEART RATE: 73 BPM | HEIGHT: 67 IN | DIASTOLIC BLOOD PRESSURE: 67 MMHG | BODY MASS INDEX: 22.35 KG/M2 | TEMPERATURE: 97.8 F | WEIGHT: 142.4 LBS | SYSTOLIC BLOOD PRESSURE: 103 MMHG

## 2021-07-02 VITALS
TEMPERATURE: 97.5 F | BODY MASS INDEX: 22.57 KG/M2 | DIASTOLIC BLOOD PRESSURE: 70 MMHG | WEIGHT: 143.8 LBS | SYSTOLIC BLOOD PRESSURE: 111 MMHG | HEIGHT: 67 IN | HEART RATE: 83 BPM

## 2021-07-02 VITALS
DIASTOLIC BLOOD PRESSURE: 75 MMHG | TEMPERATURE: 97.1 F | HEART RATE: 99 BPM | WEIGHT: 146.6 LBS | SYSTOLIC BLOOD PRESSURE: 121 MMHG | BODY MASS INDEX: 23.01 KG/M2 | HEIGHT: 67 IN

## 2021-09-09 ENCOUNTER — OFFICE VISIT (OUTPATIENT)
Dept: FAMILY MEDICINE CLINIC | Age: 66
End: 2021-09-09

## 2021-09-09 VITALS
DIASTOLIC BLOOD PRESSURE: 70 MMHG | SYSTOLIC BLOOD PRESSURE: 100 MMHG | HEIGHT: 67 IN | WEIGHT: 125 LBS | BODY MASS INDEX: 19.62 KG/M2 | TEMPERATURE: 97.8 F | HEART RATE: 95 BPM | OXYGEN SATURATION: 98 %

## 2021-09-09 DIAGNOSIS — J02.9 SORE THROAT: ICD-10-CM

## 2021-09-09 DIAGNOSIS — J01.10 ACUTE NON-RECURRENT FRONTAL SINUSITIS: ICD-10-CM

## 2021-09-09 DIAGNOSIS — R51.9 NONINTRACTABLE HEADACHE, UNSPECIFIED CHRONICITY PATTERN, UNSPECIFIED HEADACHE TYPE: Primary | ICD-10-CM

## 2021-09-09 PROBLEM — J30.9 ALLERGIC RHINITIS: Status: ACTIVE | Noted: 2021-09-09

## 2021-09-09 PROBLEM — E04.1 THYROID NODULE: Status: ACTIVE | Noted: 2021-09-09

## 2021-09-09 LAB
EXPIRATION DATE: NORMAL
EXPIRATION DATE: NORMAL
FLUAV AG UPPER RESP QL IA.RAPID: NOT DETECTED
FLUBV AG UPPER RESP QL IA.RAPID: NOT DETECTED
INTERNAL CONTROL: NORMAL
INTERNAL CONTROL: NORMAL
Lab: NORMAL
Lab: NORMAL
S PYO AG THROAT QL: NEGATIVE
SARS-COV-2 AG UPPER RESP QL IA.RAPID: NOT DETECTED

## 2021-09-09 PROCEDURE — 87428 SARSCOV & INF VIR A&B AG IA: CPT | Performed by: NURSE PRACTITIONER

## 2021-09-09 PROCEDURE — 99213 OFFICE O/P EST LOW 20 MIN: CPT | Performed by: NURSE PRACTITIONER

## 2021-09-09 PROCEDURE — 87880 STREP A ASSAY W/OPTIC: CPT | Performed by: NURSE PRACTITIONER

## 2021-09-09 PROCEDURE — 87081 CULTURE SCREEN ONLY: CPT | Performed by: NURSE PRACTITIONER

## 2021-09-09 RX ORDER — ASPIRIN 81 MG/1
TABLET ORAL
COMMUNITY

## 2021-09-09 RX ORDER — AMOXICILLIN AND CLAVULANATE POTASSIUM 875; 125 MG/1; MG/1
1 TABLET, FILM COATED ORAL 2 TIMES DAILY
Qty: 14 TABLET | Refills: 0 | Status: SHIPPED | OUTPATIENT
Start: 2021-09-09 | End: 2021-10-18

## 2021-09-09 RX ORDER — FLUTICASONE FUROATE 27.5 UG/1
SPRAY, METERED NASAL
COMMUNITY

## 2021-09-09 RX ORDER — CETIRIZINE HYDROCHLORIDE 10 MG/1
10 TABLET ORAL DAILY PRN
COMMUNITY

## 2021-09-09 RX ORDER — ACETAMINOPHEN 500 MG
500 TABLET ORAL EVERY MORNING
COMMUNITY

## 2021-09-09 NOTE — ASSESSMENT & PLAN NOTE
Negative for Covid flu and strep.  States this is her usual annual sinus infection.  Treat with Augmentin and continue Zyrtec and Flonase.  Follow-up if not improving.  Offered Covid PCR test if she had a high index of suspicion of having Covid or exposure to Covid.  Patient declines Covid PCR test.

## 2021-09-09 NOTE — PROGRESS NOTES
"Chief Complaint  Sore Throat (patient complains only head congestion,bilateral ear pain, sore throat, runny nose, and headache for 2-3 days ago )    Subjective  Lori is a 65-year-old female here today with 4-day complaint of nasal congestion, runny nose, sinus pain and pressure, sore throat x4 days.  She is afebrile but did feel chilled on occasion.  Denies cough, shortness of breath, loss of taste or smell, or any other symptoms.  She states this feels like her annual sinus infection.  Has been did have similar symptoms a little over 1 week ago.        Lori Noe presents to [facility]    Review of Systems   Constitutional: Positive for chills. Negative for fatigue and fever.   HENT: Positive for congestion, postnasal drip, rhinorrhea, sinus pressure and sore throat.    Respiratory: Negative.    Cardiovascular: Negative.    Gastrointestinal: Negative.         Objective   Vital Signs:   Vitals:    09/09/21 1034   BP: 100/70   BP Location: Left arm   Patient Position: Sitting   Cuff Size: Adult   Pulse: 95   Temp: 97.8 °F (36.6 °C)   TempSrc: Oral   SpO2: 98%   Weight: 56.7 kg (125 lb)   Height: 170.2 cm (67\")      Physical Exam  Vitals reviewed.   Constitutional:       General: She is not in acute distress.     Appearance: Normal appearance. She is well-developed.   HENT:      Right Ear: A middle ear effusion is present.      Left Ear: A middle ear effusion is present.      Mouth/Throat:      Mouth: Mucous membranes are moist.      Pharynx: Oropharynx is clear. No oropharyngeal exudate.   Cardiovascular:      Rate and Rhythm: Normal rate and regular rhythm.      Heart sounds: Normal heart sounds.   Pulmonary:      Effort: Pulmonary effort is normal.      Breath sounds: Normal breath sounds.   Musculoskeletal:      Right lower leg: No edema.      Left lower leg: No edema.   Lymphadenopathy:      Cervical: No cervical adenopathy.   Skin:     General: Skin is warm and dry. "   Neurological:      General: No focal deficit present.      Mental Status: She is alert.   Psychiatric:         Attention and Perception: Attention normal.         Mood and Affect: Mood and affect normal.         Behavior: Behavior normal.          Result Review :                Assessment and Plan    Diagnoses and all orders for this visit:    1. Nonintractable headache, unspecified chronicity pattern, unspecified headache type (Primary)  -     POCT rapid strep A  -     POCT SARS-CoV-2 Antigen GINA + Flu    2. Acute non-recurrent frontal sinusitis  Assessment & Plan:  Negative for Covid flu and strep.  States this is her usual annual sinus infection.  Treat with Augmentin and continue Zyrtec and Flonase.  Follow-up if not improving.  Offered Covid PCR test if she had a high index of suspicion of having Covid or exposure to Covid.  Patient declines Covid PCR test.    Orders:  -     POCT rapid strep A  -     POCT SARS-CoV-2 Antigen GINA + Flu  -     amoxicillin-clavulanate (Augmentin) 875-125 MG per tablet; Take 1 tablet by mouth 2 (Two) Times a Day.  Dispense: 14 tablet; Refill: 0    3. Sore throat  -     POCT rapid strep A  -     Beta Strep Culture, Throat - , Throat; Future  -     POCT SARS-CoV-2 Antigen GINA + Flu  -     Beta Strep Culture, Throat - Swab,      Follow Up    No follow-ups on file.  Patient was given instructions and counseling regarding her condition or for health maintenance advice. Please see specific information pulled into the AVS if appropriate.

## 2021-09-11 LAB — BACTERIA SPEC AEROBE CULT: NORMAL

## 2021-10-18 ENCOUNTER — OFFICE VISIT (OUTPATIENT)
Dept: FAMILY MEDICINE CLINIC | Age: 66
End: 2021-10-18

## 2021-10-18 VITALS
TEMPERATURE: 97.6 F | BODY MASS INDEX: 19.97 KG/M2 | HEIGHT: 67 IN | HEART RATE: 72 BPM | WEIGHT: 127.2 LBS | SYSTOLIC BLOOD PRESSURE: 113 MMHG | DIASTOLIC BLOOD PRESSURE: 69 MMHG

## 2021-10-18 DIAGNOSIS — H60.332 ACUTE SWIMMER'S EAR OF LEFT SIDE: ICD-10-CM

## 2021-10-18 DIAGNOSIS — H10.33 ACUTE CONJUNCTIVITIS OF BOTH EYES, UNSPECIFIED ACUTE CONJUNCTIVITIS TYPE: Primary | ICD-10-CM

## 2021-10-18 PROBLEM — J01.10 ACUTE NON-RECURRENT FRONTAL SINUSITIS: Status: RESOLVED | Noted: 2021-09-09 | Resolved: 2021-10-18

## 2021-10-18 PROBLEM — R51.9 NONINTRACTABLE HEADACHE: Status: RESOLVED | Noted: 2021-09-09 | Resolved: 2021-10-18

## 2021-10-18 PROBLEM — J02.9 SORE THROAT: Status: RESOLVED | Noted: 2021-09-09 | Resolved: 2021-10-18

## 2021-10-18 PROCEDURE — 99213 OFFICE O/P EST LOW 20 MIN: CPT | Performed by: FAMILY MEDICINE

## 2021-10-18 RX ORDER — NEOMYCIN SULFATE, POLYMYXIN B SULFATE AND HYDROCORTISONE 10; 3.5; 1 MG/ML; MG/ML; [USP'U]/ML
3 SUSPENSION/ DROPS AURICULAR (OTIC) 3 TIMES DAILY
Qty: 10 ML | Refills: 0 | Status: SHIPPED | OUTPATIENT
Start: 2021-10-18 | End: 2021-10-23

## 2021-10-18 RX ORDER — POLYMYXIN B SULFATE AND TRIMETHOPRIM 1; 10000 MG/ML; [USP'U]/ML
2 SOLUTION OPHTHALMIC 3 TIMES DAILY
Qty: 10 ML | Refills: 0 | Status: SHIPPED | OUTPATIENT
Start: 2021-10-18 | End: 2021-10-23

## 2021-10-18 NOTE — PROGRESS NOTES
Chief Complaint  Eye Drainage (in mornings) and Earache    Subjective          Lori Noe presents to [facility]  --BOTH EYES ITCHING AND DRAINING FOR THE PAST THREE DAYS  --LEFT EAR HAVING SHARP PAINS OFF AND ON.  USES Q-TIPS A LOT        No Known Allergies     Health Maintenance Due   Topic Date Due   • COLORECTAL CANCER SCREENING  Never done   • ANNUAL PHYSICAL  Never done   • TDAP/TD VACCINES (1 - Tdap) Never done   • ZOSTER VACCINE (1 of 2) Never done   • INFLUENZA VACCINE  08/01/2021   • HEPATITIS C SCREENING  Never done   • MAMMOGRAM  09/11/2021        Current Outpatient Medications on File Prior to Visit   Medication Sig   • acetaminophen (TYLENOL) 500 MG tablet Take 500 mg by mouth Every Morning.   • aspirin (aspirin) 81 MG EC tablet Aspir-81 81 mg oral tablet,delayed release (DR/EC) take 1 tablet (81 mg) by oral route once daily   Active   • cetirizine (ZyrTEC Allergy) 10 MG tablet Take 10 mg by mouth Daily As Needed.   • fluticasone (Flonase Sensimist) 27.5 MCG/SPRAY nasal spray Flonase Sensimist 27.5 mcg/actuation nasal spray,suspension inhale 1 puff by nasal route daily   Active   • Unable to find 1 each 1 (One) Time. Rohto Med Name 1 drop in each eye every day   • [DISCONTINUED] amoxicillin-clavulanate (Augmentin) 875-125 MG per tablet Take 1 tablet by mouth 2 (Two) Times a Day.     No current facility-administered medications on file prior to visit.       Immunization History   Administered Date(s) Administered   • COVID-19 (PFIZER) 03/24/2021, 04/14/2021   • Influenza Quad Vaccine (Inpatient) 09/27/2013, 10/04/2017       Review of Systems   Constitutional: Negative for activity change, appetite change, chills, fatigue and fever.   HENT: Negative for congestion, hearing loss, rhinorrhea and sore throat.    Eyes: Positive for discharge. Negative for blurred vision.   Respiratory: Negative for cough.    Gastrointestinal: Negative for abdominal pain,  "constipation, diarrhea, nausea and vomiting.   Musculoskeletal: Negative for arthralgias and myalgias.   Neurological: Negative for headache.        Objective     /69 (BP Location: Left arm, Patient Position: Sitting)   Pulse 72   Temp 97.6 °F (36.4 °C) (Oral)   Ht 170.2 cm (67\")   Wt 57.7 kg (127 lb 3.2 oz)   BMI 19.92 kg/m²       Physical Exam  Vitals and nursing note reviewed.   Constitutional:       General: She is not in acute distress.     Appearance: Normal appearance.   HENT:      Right Ear: Tympanic membrane normal.      Left Ear: Tympanic membrane normal.      Ears:      Comments: LEFT CANAL IS SWOLLEN AND PINNA IS TENDER TO MOVEMENT      Mouth/Throat:      Pharynx: Oropharynx is clear.   Eyes:      General:         Right eye: Discharge present.         Left eye: Discharge present.  Cardiovascular:      Rate and Rhythm: Normal rate and regular rhythm.      Heart sounds: Normal heart sounds. No murmur heard.      Pulmonary:      Effort: Pulmonary effort is normal.      Breath sounds: Normal breath sounds.   Abdominal:      Palpations: Abdomen is soft.      Tenderness: There is no abdominal tenderness.   Musculoskeletal:      Cervical back: Neck supple.      Right lower leg: No edema.      Left lower leg: No edema.   Lymphadenopathy:      Cervical: No cervical adenopathy.   Neurological:      General: No focal deficit present.      Mental Status: She is alert.      Cranial Nerves: No cranial nerve deficit.      Coordination: Coordination normal.      Gait: Gait normal.   Psychiatric:         Mood and Affect: Mood normal.         Behavior: Behavior normal.         Result Review :                             Assessment and Plan      Diagnoses and all orders for this visit:    1. Acute conjunctivitis of both eyes, unspecified acute conjunctivitis type (Primary)  Assessment & Plan:  WILL PRESCRIBE DROPS AS NOTED.  ADVISED TO SEE AN EYE DOCTOR IF NOT IMPROVED IN 3-4 DAYS    Orders:  -     " trimethoprim-polymyxin b (Polytrim) 99348-0.1 UNIT/ML-% ophthalmic solution; Administer 2 drops to both eyes 3 (Three) Times a Day for 5 days.  Dispense: 10 mL; Refill: 0    2. Acute swimmer's ear of left side  Assessment & Plan:  WILL PRESCRIBE DROPS AS NOTED.  ADVISED TO DECREASE THE USE OF Q-TIPS.  CONSIDER ENT EVAL.      Orders:  -     neomycin-polymyxin-hydrocortisone (CORTISPORIN) 3.5-76453-4 otic suspension; Administer 3 drops into the left ear 3 (Three) Times a Day for 5 days.  Dispense: 10 mL; Refill: 0          Follow Up     Return if symptoms worsen or fail to improve.    Patient was given instructions and counseling regarding her condition or for health maintenance advice. Please see specific information pulled into the AVS if appropriate.

## 2021-11-02 ENCOUNTER — FLU SHOT (OUTPATIENT)
Dept: FAMILY MEDICINE CLINIC | Age: 66
End: 2021-11-02

## 2021-11-02 DIAGNOSIS — Z23 NEED FOR INFLUENZA VACCINATION: Primary | ICD-10-CM

## 2021-11-02 PROCEDURE — 90471 IMMUNIZATION ADMIN: CPT | Performed by: FAMILY MEDICINE

## 2021-11-02 PROCEDURE — 90686 IIV4 VACC NO PRSV 0.5 ML IM: CPT | Performed by: FAMILY MEDICINE

## 2021-12-10 ENCOUNTER — OFFICE VISIT (OUTPATIENT)
Dept: FAMILY MEDICINE CLINIC | Age: 66
End: 2021-12-10

## 2021-12-10 VITALS
SYSTOLIC BLOOD PRESSURE: 101 MMHG | BODY MASS INDEX: 20.25 KG/M2 | HEART RATE: 78 BPM | WEIGHT: 129 LBS | TEMPERATURE: 98 F | DIASTOLIC BLOOD PRESSURE: 72 MMHG | HEIGHT: 67 IN | OXYGEN SATURATION: 98 %

## 2021-12-10 DIAGNOSIS — R05.9 COUGH: ICD-10-CM

## 2021-12-10 DIAGNOSIS — J01.00 ACUTE NON-RECURRENT MAXILLARY SINUSITIS: ICD-10-CM

## 2021-12-10 DIAGNOSIS — H66.90 ACUTE OTITIS MEDIA, UNSPECIFIED OTITIS MEDIA TYPE: Primary | ICD-10-CM

## 2021-12-10 LAB
EXPIRATION DATE: NORMAL
FLUAV AG UPPER RESP QL IA.RAPID: NOT DETECTED
FLUBV AG UPPER RESP QL IA.RAPID: NOT DETECTED
INTERNAL CONTROL: NORMAL
Lab: NORMAL
SARS-COV-2 AG UPPER RESP QL IA.RAPID: NOT DETECTED

## 2021-12-10 PROCEDURE — 87428 SARSCOV & INF VIR A&B AG IA: CPT | Performed by: NURSE PRACTITIONER

## 2021-12-10 PROCEDURE — 99212 OFFICE O/P EST SF 10 MIN: CPT | Performed by: NURSE PRACTITIONER

## 2021-12-10 RX ORDER — AZITHROMYCIN 250 MG/1
TABLET, FILM COATED ORAL
Qty: 6 TABLET | Refills: 0 | Status: SHIPPED | OUTPATIENT
Start: 2021-12-10 | End: 2022-05-17

## 2021-12-10 NOTE — PROGRESS NOTES
"Chief Complaint  URI, Nasal Congestion, and Cough    Subjective          Lori Noe presents to [facility]  URI   This is a new problem. The current episode started in the past 7 days. The problem has been gradually improving. There has been no fever. Associated symptoms include congestion, coughing, ear pain, headaches, rhinorrhea, sinus pain and a sore throat. She has tried acetaminophen, decongestant and antihistamine for the symptoms. The treatment provided mild relief.   Cough  This is a new problem. The current episode started in the past 7 days. The problem has been unchanged. The cough is non-productive. Associated symptoms include chills, ear congestion, ear pain, headaches, nasal congestion, rhinorrhea and a sore throat. Pertinent negatives include no fever or shortness of breath. Nothing aggravates the symptoms. She has tried nothing for the symptoms. The treatment provided mild relief.       Objective   Vital Signs:   /72 (BP Location: Right arm, Patient Position: Sitting, Cuff Size: Adult)   Pulse 78   Temp 98 °F (36.7 °C) (Oral)   Ht 170.2 cm (67\")   Wt 58.5 kg (129 lb)   SpO2 98% Comment: room air  BMI 20.20 kg/m²     Physical Exam  HENT:      Head: Normocephalic.      Right Ear: A middle ear effusion is present. Tympanic membrane is injected and erythematous.      Left Ear: A middle ear effusion is present. Tympanic membrane is erythematous.      Nose:      Right Sinus: Maxillary sinus tenderness present.      Left Sinus: Maxillary sinus tenderness present.   Cardiovascular:      Rate and Rhythm: Normal rate and regular rhythm.      Pulses: Normal pulses.      Heart sounds: Normal heart sounds.   Pulmonary:      Effort: Pulmonary effort is normal. No respiratory distress.      Breath sounds: Normal breath sounds. No stridor. No wheezing, rhonchi or rales.   Skin:     General: Skin is warm and dry.   Neurological:      Mental Status: She is alert " and oriented to person, place, and time.   Psychiatric:         Mood and Affect: Mood normal.        Result Review :                 Assessment and Plan    Diagnoses and all orders for this visit:    1. Acute otitis media, unspecified otitis media type (Primary)  -     azithromycin (Zithromax Z-Lukas) 250 MG tablet; Take 2 tablets by mouth on day 1, then 1 tablet daily on days 2-5  Dispense: 6 tablet; Refill: 0    2. Acute non-recurrent maxillary sinusitis  -     azithromycin (Zithromax Z-Lukas) 250 MG tablet; Take 2 tablets by mouth on day 1, then 1 tablet daily on days 2-5  Dispense: 6 tablet; Refill: 0    3. Cough  -     POCT SARS-CoV-2 Antigen GINA + Flu        Follow Up   Return if symptoms worsen or fail to improve.  Patient was given instructions and counseling regarding her condition or for health maintenance advice. Please see specific information pulled into the AVS if appropriate.

## 2021-12-16 ENCOUNTER — TELEPHONE (OUTPATIENT)
Dept: FAMILY MEDICINE CLINIC | Age: 66
End: 2021-12-16

## 2021-12-16 DIAGNOSIS — H66.90 ACUTE OTITIS MEDIA, UNSPECIFIED OTITIS MEDIA TYPE: Primary | ICD-10-CM

## 2021-12-16 DIAGNOSIS — J01.00 ACUTE NON-RECURRENT MAXILLARY SINUSITIS: ICD-10-CM

## 2021-12-16 DIAGNOSIS — H66.90 ACUTE OTITIS MEDIA, UNSPECIFIED OTITIS MEDIA TYPE: ICD-10-CM

## 2021-12-16 RX ORDER — GUAIFENESIN 200 MG/1
400 TABLET ORAL EVERY 4 HOURS PRN
Qty: 40 TABLET | Refills: 0 | Status: SHIPPED | OUTPATIENT
Start: 2021-12-16 | End: 2022-05-17

## 2021-12-16 RX ORDER — GUAIFENESIN 200 MG/1
400 TABLET ORAL EVERY 4 HOURS PRN
Qty: 40 TABLET | Refills: 0 | Status: SHIPPED | OUTPATIENT
Start: 2021-12-16 | End: 2021-12-16 | Stop reason: SDUPTHER

## 2021-12-16 RX ORDER — AMOXICILLIN 875 MG/1
875 TABLET, COATED ORAL 2 TIMES DAILY
Qty: 20 TABLET | Refills: 0 | Status: SHIPPED | OUTPATIENT
Start: 2021-12-16 | End: 2021-12-26

## 2021-12-16 RX ORDER — AMOXICILLIN 875 MG/1
875 TABLET, COATED ORAL 2 TIMES DAILY
Qty: 20 TABLET | Refills: 0 | Status: SHIPPED | OUTPATIENT
Start: 2021-12-16 | End: 2021-12-16 | Stop reason: SDUPTHER

## 2021-12-16 NOTE — TELEPHONE ENCOUNTER
Caller: Lori Noe    Relationship: Self    Best call back number: [phone]     Who are you requesting to speak with (clinical staff, provider,  specific staff member): CLINICAL    What was the call regarding: THE PATIENT HAS CALLED STATING THE MEDICATION SHE WAS PRESCRIBED azithromycin (Zithromax Z-Lukas) 250 MG tablet IS NOT HELPING.  SHE IS REQUESTING TO KNOW IF SHE IS NEEDING ANOTHER APPOINTMENT OR IF ANOTHER SCRIPT CAN BE CALLED IN FOR PATIENT.     Do you require a callback: YES PLEASE ADVISE.

## 2021-12-16 NOTE — TELEPHONE ENCOUNTER
Sent to pharmacy. Looking back, Amoxicillin usually works for her. I sent in some Mucinex as well. If no improvement, likely not bacterial and would recommend seeing an allergist.

## 2022-05-17 ENCOUNTER — OFFICE VISIT (OUTPATIENT)
Dept: FAMILY MEDICINE CLINIC | Age: 67
End: 2022-05-17

## 2022-05-17 ENCOUNTER — LAB (OUTPATIENT)
Dept: LAB | Facility: HOSPITAL | Age: 67
End: 2022-05-17

## 2022-05-17 VITALS
BODY MASS INDEX: 19.93 KG/M2 | OXYGEN SATURATION: 100 % | HEART RATE: 75 BPM | SYSTOLIC BLOOD PRESSURE: 108 MMHG | WEIGHT: 127 LBS | HEIGHT: 67 IN | DIASTOLIC BLOOD PRESSURE: 65 MMHG

## 2022-05-17 DIAGNOSIS — Z13.1 SCREENING FOR DIABETES MELLITUS: Primary | ICD-10-CM

## 2022-05-17 DIAGNOSIS — Z13.29 THYROID DISORDER SCREEN: ICD-10-CM

## 2022-05-17 DIAGNOSIS — Z13.0 SCREENING FOR DEFICIENCY ANEMIA: ICD-10-CM

## 2022-05-17 DIAGNOSIS — Z13.220 SCREENING CHOLESTEROL LEVEL: ICD-10-CM

## 2022-05-17 DIAGNOSIS — R23.3 EASY BRUISING: ICD-10-CM

## 2022-05-17 DIAGNOSIS — Z13.1 SCREENING FOR DIABETES MELLITUS: ICD-10-CM

## 2022-05-17 PROBLEM — H60.332 SWIMMER'S EAR OF LEFT SIDE: Status: RESOLVED | Noted: 2021-10-18 | Resolved: 2022-05-17

## 2022-05-17 PROBLEM — H10.33 ACUTE CONJUNCTIVITIS OF BOTH EYES: Status: RESOLVED | Noted: 2021-10-18 | Resolved: 2022-05-17

## 2022-05-17 LAB
ALBUMIN SERPL-MCNC: 4.5 G/DL (ref 3.5–5.2)
ALBUMIN/GLOB SERPL: 2.5 G/DL
ALP SERPL-CCNC: 67 U/L (ref 39–117)
ALT SERPL W P-5'-P-CCNC: 13 U/L (ref 1–33)
ANION GAP SERPL CALCULATED.3IONS-SCNC: 11.3 MMOL/L (ref 5–15)
AST SERPL-CCNC: 15 U/L (ref 1–32)
BASOPHILS # BLD AUTO: 0.02 10*3/MM3 (ref 0–0.2)
BASOPHILS NFR BLD AUTO: 0.4 % (ref 0–1.5)
BILIRUB SERPL-MCNC: 0.3 MG/DL (ref 0–1.2)
BUN SERPL-MCNC: 5 MG/DL (ref 8–23)
BUN/CREAT SERPL: 6.2 (ref 7–25)
CALCIUM SPEC-SCNC: 9.1 MG/DL (ref 8.6–10.5)
CHLORIDE SERPL-SCNC: 105 MMOL/L (ref 98–107)
CHOLEST SERPL-MCNC: 236 MG/DL (ref 0–200)
CO2 SERPL-SCNC: 23.7 MMOL/L (ref 22–29)
CREAT SERPL-MCNC: 0.81 MG/DL (ref 0.57–1)
DEPRECATED RDW RBC AUTO: 45.7 FL (ref 37–54)
EGFRCR SERPLBLD CKD-EPI 2021: 81.2 ML/MIN/1.73
EOSINOPHIL # BLD AUTO: 0.05 10*3/MM3 (ref 0–0.4)
EOSINOPHIL NFR BLD AUTO: 1 % (ref 0.3–6.2)
ERYTHROCYTE [DISTWIDTH] IN BLOOD BY AUTOMATED COUNT: 13.2 % (ref 12.3–15.4)
GLOBULIN UR ELPH-MCNC: 1.8 GM/DL
GLUCOSE SERPL-MCNC: 88 MG/DL (ref 65–99)
HCT VFR BLD AUTO: 38 % (ref 34–46.6)
HDLC SERPL-MCNC: 47 MG/DL (ref 40–60)
HGB BLD-MCNC: 12.2 G/DL (ref 12–15.9)
IMM GRANULOCYTES # BLD AUTO: 0 10*3/MM3 (ref 0–0.05)
IMM GRANULOCYTES NFR BLD AUTO: 0 % (ref 0–0.5)
LDLC SERPL CALC-MCNC: 172 MG/DL (ref 0–100)
LDLC/HDLC SERPL: 3.61 {RATIO}
LYMPHOCYTES # BLD AUTO: 1.28 10*3/MM3 (ref 0.7–3.1)
LYMPHOCYTES NFR BLD AUTO: 25.1 % (ref 19.6–45.3)
MCH RBC QN AUTO: 30 PG (ref 26.6–33)
MCHC RBC AUTO-ENTMCNC: 32.1 G/DL (ref 31.5–35.7)
MCV RBC AUTO: 93.6 FL (ref 79–97)
MONOCYTES # BLD AUTO: 0.32 10*3/MM3 (ref 0.1–0.9)
MONOCYTES NFR BLD AUTO: 6.3 % (ref 5–12)
NEUTROPHILS NFR BLD AUTO: 3.42 10*3/MM3 (ref 1.7–7)
NEUTROPHILS NFR BLD AUTO: 67.2 % (ref 42.7–76)
PLATELET # BLD AUTO: 230 10*3/MM3 (ref 140–450)
PMV BLD AUTO: 10.6 FL (ref 6–12)
POTASSIUM SERPL-SCNC: 3.9 MMOL/L (ref 3.5–5.2)
PROT SERPL-MCNC: 6.3 G/DL (ref 6–8.5)
RBC # BLD AUTO: 4.06 10*6/MM3 (ref 3.77–5.28)
SODIUM SERPL-SCNC: 140 MMOL/L (ref 136–145)
TRIGL SERPL-MCNC: 97 MG/DL (ref 0–150)
TSH SERPL DL<=0.05 MIU/L-ACNC: 0.68 UIU/ML (ref 0.27–4.2)
VLDLC SERPL-MCNC: 17 MG/DL (ref 5–40)
WBC NRBC COR # BLD: 5.09 10*3/MM3 (ref 3.4–10.8)

## 2022-05-17 PROCEDURE — 84443 ASSAY THYROID STIM HORMONE: CPT

## 2022-05-17 PROCEDURE — 80053 COMPREHEN METABOLIC PANEL: CPT

## 2022-05-17 PROCEDURE — 85025 COMPLETE CBC W/AUTO DIFF WBC: CPT

## 2022-05-17 PROCEDURE — 36415 COLL VENOUS BLD VENIPUNCTURE: CPT

## 2022-05-17 PROCEDURE — 80061 LIPID PANEL: CPT

## 2022-05-17 PROCEDURE — 99214 OFFICE O/P EST MOD 30 MIN: CPT | Performed by: NURSE PRACTITIONER

## 2022-05-17 NOTE — ASSESSMENT & PLAN NOTE
Further treatment pending lab results.  Could be due to daily Tylenol and low-dose aspirin.  Patient states if her cholesterol is elevated today she will not want treatment other than diet and exercise.

## 2022-05-17 NOTE — PROGRESS NOTES
"Chief Complaint  Other (Patient complains of bruising on bilateral arms since for 4 days )    Subjective  Patient is a 66-year-old female who is here today to discuss easy bruising noted this week.  She has a bruise on each lower arm with no known injury or trauma.  She does take a daily Tylenol and a baby aspirin but that is not a new regimen.  She denies fatigue, fever, or any other concerns.  She declines colon or breast cancer screening and is aware of the reasons for the testing.  She did see ear nose and throat specialist after a thyroid ultrasound in January 2020.  Nodule had dissipated at that time and no further work-up was recommended.        Lori Noe presents to [facility]          Objective   Vital Signs:   Vitals:    05/17/22 1258   BP: 108/65   BP Location: Left arm   Patient Position: Sitting   Cuff Size: Adult   Pulse: 75   SpO2: 100%   Weight: 57.6 kg (127 lb)   Height: 170.2 cm (67\")      Body mass index is 19.89 kg/m².  Physical Exam  Vitals reviewed.   Constitutional:       General: She is not in acute distress.     Appearance: Normal appearance. She is well-developed.   Neck:      Thyroid: No thyroid mass, thyromegaly or thyroid tenderness.   Cardiovascular:      Rate and Rhythm: Normal rate and regular rhythm.      Pulses:           Posterior tibial pulses are 2+ on the right side and 2+ on the left side.      Heart sounds: Normal heart sounds.   Pulmonary:      Effort: Pulmonary effort is normal.      Breath sounds: Normal breath sounds.   Musculoskeletal:      Right lower leg: No edema.      Left lower leg: No edema.   Skin:     General: Skin is warm and dry.          Neurological:      General: No focal deficit present.      Mental Status: She is alert.   Psychiatric:         Attention and Perception: Attention normal.         Mood and Affect: Mood and affect normal.         Behavior: Behavior normal.          Result Review :              "   Assessment and Plan    Diagnoses and all orders for this visit:    1. Screening for diabetes mellitus (Primary)  -     Comprehensive metabolic panel; Future    2. Screening cholesterol level  -     Lipid panel; Future    3. Screening for deficiency anemia  -     CBC w AUTO Differential; Future    4. Thyroid disorder screen  -     TSH; Future    5. Easy bruising  Assessment & Plan:  Further treatment pending lab results.  Could be due to daily Tylenol and low-dose aspirin.  Patient states if her cholesterol is elevated today she will not want treatment other than diet and exercise.        Follow Up    No follow-ups on file.  Patient was given instructions and counseling regarding her condition or for health maintenance advice. Please see specific information pulled into the AVS if appropriate.

## 2022-05-19 NOTE — PROGRESS NOTES
Normal blood sugar, kidney, liver function.  Complete blood count is normal including platelet levels.  Normal thyroid function you are not anemic.  Cholesterol does have some mild elevations.  Consume less saturated fat and fried foods and get exercise such as walking to help lower cholesterol levels.  I understand that you are not interested in taking cholesterol medication at this time.  You could consider taking omega-3 fatty acid supplement over-the-counter or red yeast rice and coenzyme every 10 over-the-counter.  I do not see anything on these labs that would concern me regarding your easy bruising.

## 2022-11-07 ENCOUNTER — CLINICAL SUPPORT (OUTPATIENT)
Dept: FAMILY MEDICINE CLINIC | Age: 67
End: 2022-11-07

## 2022-11-07 DIAGNOSIS — Z23 NEED FOR INFLUENZA VACCINATION: Primary | ICD-10-CM

## 2022-11-07 PROCEDURE — 90471 IMMUNIZATION ADMIN: CPT | Performed by: FAMILY MEDICINE

## 2022-11-07 PROCEDURE — 90686 IIV4 VACC NO PRSV 0.5 ML IM: CPT | Performed by: FAMILY MEDICINE

## 2023-02-01 ENCOUNTER — OFFICE VISIT (OUTPATIENT)
Dept: FAMILY MEDICINE CLINIC | Age: 68
End: 2023-02-01
Payer: MEDICARE

## 2023-02-01 ENCOUNTER — LAB (OUTPATIENT)
Dept: LAB | Facility: HOSPITAL | Age: 68
End: 2023-02-01
Payer: MEDICARE

## 2023-02-01 VITALS
HEIGHT: 67 IN | HEART RATE: 80 BPM | SYSTOLIC BLOOD PRESSURE: 121 MMHG | BODY MASS INDEX: 21.35 KG/M2 | DIASTOLIC BLOOD PRESSURE: 79 MMHG | WEIGHT: 136 LBS

## 2023-02-01 DIAGNOSIS — Z87.891 HISTORY OF NICOTINE DEPENDENCE: ICD-10-CM

## 2023-02-01 DIAGNOSIS — R00.2 PALPITATIONS: ICD-10-CM

## 2023-02-01 DIAGNOSIS — Z00.00 WELCOME TO MEDICARE PREVENTIVE VISIT: Primary | ICD-10-CM

## 2023-02-01 DIAGNOSIS — E78.00 HIGH CHOLESTEROL: ICD-10-CM

## 2023-02-01 DIAGNOSIS — G56.03 CARPAL TUNNEL SYNDROME, BILATERAL: ICD-10-CM

## 2023-02-01 DIAGNOSIS — Z12.11 COLON CANCER SCREENING: ICD-10-CM

## 2023-02-01 DIAGNOSIS — Z23 IMMUNIZATION DUE: ICD-10-CM

## 2023-02-01 DIAGNOSIS — R22.31 SUBCUTANEOUS NODULE OF RIGHT UPPER EXTREMITY: ICD-10-CM

## 2023-02-01 DIAGNOSIS — E04.1 NODULE OF LEFT LOBE OF THYROID GLAND: ICD-10-CM

## 2023-02-01 PROBLEM — Z53.20 MAMMOGRAM DECLINED: Status: ACTIVE | Noted: 2023-02-01

## 2023-02-01 PROBLEM — Z13.0 SCREENING FOR DEFICIENCY ANEMIA: Status: RESOLVED | Noted: 2022-05-17 | Resolved: 2023-02-01

## 2023-02-01 PROBLEM — Z13.1 SCREENING FOR DIABETES MELLITUS: Status: RESOLVED | Noted: 2022-05-17 | Resolved: 2023-02-01

## 2023-02-01 PROBLEM — Z72.0 VAPES NICOTINE CONTAINING SUBSTANCE: Status: ACTIVE | Noted: 2023-02-01

## 2023-02-01 PROBLEM — Z13.220 SCREENING CHOLESTEROL LEVEL: Status: RESOLVED | Noted: 2022-05-17 | Resolved: 2023-02-01

## 2023-02-01 PROBLEM — R23.3 EASY BRUISING: Status: RESOLVED | Noted: 2022-05-17 | Resolved: 2023-02-01

## 2023-02-01 PROBLEM — Z13.29 THYROID DISORDER SCREEN: Status: RESOLVED | Noted: 2022-05-17 | Resolved: 2023-02-01

## 2023-02-01 PROBLEM — Z28.21 VACCINATION REFUSED BY PATIENT: Status: ACTIVE | Noted: 2023-02-01

## 2023-02-01 PROBLEM — J30.89 OTHER ALLERGIC RHINITIS: Status: ACTIVE | Noted: 2021-09-09

## 2023-02-01 PROBLEM — Z53.20 COLONOSCOPY REFUSED: Status: ACTIVE | Noted: 2023-02-01

## 2023-02-01 LAB
ALBUMIN SERPL-MCNC: 4.3 G/DL (ref 3.5–5.2)
ALBUMIN/GLOB SERPL: 2 G/DL
ALP SERPL-CCNC: 75 U/L (ref 39–117)
ALT SERPL W P-5'-P-CCNC: 16 U/L (ref 1–33)
ANION GAP SERPL CALCULATED.3IONS-SCNC: 10.1 MMOL/L (ref 5–15)
AST SERPL-CCNC: 22 U/L (ref 1–32)
BILIRUB SERPL-MCNC: 0.2 MG/DL (ref 0–1.2)
BUN SERPL-MCNC: 6 MG/DL (ref 8–23)
BUN/CREAT SERPL: 7.1 (ref 7–25)
CALCIUM SPEC-SCNC: 8.9 MG/DL (ref 8.6–10.5)
CHLORIDE SERPL-SCNC: 103 MMOL/L (ref 98–107)
CHOLEST SERPL-MCNC: 262 MG/DL (ref 0–200)
CO2 SERPL-SCNC: 28.9 MMOL/L (ref 22–29)
CREAT SERPL-MCNC: 0.85 MG/DL (ref 0.57–1)
DEPRECATED RDW RBC AUTO: 43.6 FL (ref 37–54)
EGFRCR SERPLBLD CKD-EPI 2021: 76.1 ML/MIN/1.73
ERYTHROCYTE [DISTWIDTH] IN BLOOD BY AUTOMATED COUNT: 12.8 % (ref 12.3–15.4)
GLOBULIN UR ELPH-MCNC: 2.1 GM/DL
GLUCOSE SERPL-MCNC: 95 MG/DL (ref 65–99)
HCT VFR BLD AUTO: 39.4 % (ref 34–46.6)
HDLC SERPL-MCNC: 42 MG/DL (ref 40–60)
HGB BLD-MCNC: 13 G/DL (ref 12–15.9)
LDLC SERPL CALC-MCNC: 185 MG/DL (ref 0–100)
LDLC/HDLC SERPL: 4.35 {RATIO}
MCH RBC QN AUTO: 30 PG (ref 26.6–33)
MCHC RBC AUTO-ENTMCNC: 33 G/DL (ref 31.5–35.7)
MCV RBC AUTO: 91 FL (ref 79–97)
PLATELET # BLD AUTO: 248 10*3/MM3 (ref 140–450)
PMV BLD AUTO: 10.3 FL (ref 6–12)
POTASSIUM SERPL-SCNC: 3.9 MMOL/L (ref 3.5–5.2)
PROT SERPL-MCNC: 6.4 G/DL (ref 6–8.5)
RBC # BLD AUTO: 4.33 10*6/MM3 (ref 3.77–5.28)
SODIUM SERPL-SCNC: 142 MMOL/L (ref 136–145)
TRIGL SERPL-MCNC: 187 MG/DL (ref 0–150)
TSH SERPL DL<=0.05 MIU/L-ACNC: 1.17 UIU/ML (ref 0.27–4.2)
VLDLC SERPL-MCNC: 35 MG/DL (ref 5–40)
WBC NRBC COR # BLD: 4.77 10*3/MM3 (ref 3.4–10.8)

## 2023-02-01 PROCEDURE — 99214 OFFICE O/P EST MOD 30 MIN: CPT | Performed by: FAMILY MEDICINE

## 2023-02-01 PROCEDURE — 80053 COMPREHEN METABOLIC PANEL: CPT | Performed by: FAMILY MEDICINE

## 2023-02-01 PROCEDURE — 84443 ASSAY THYROID STIM HORMONE: CPT | Performed by: FAMILY MEDICINE

## 2023-02-01 PROCEDURE — 1159F MED LIST DOCD IN RCRD: CPT | Performed by: FAMILY MEDICINE

## 2023-02-01 PROCEDURE — 1170F FXNL STATUS ASSESSED: CPT | Performed by: FAMILY MEDICINE

## 2023-02-01 PROCEDURE — 85027 COMPLETE CBC AUTOMATED: CPT | Performed by: FAMILY MEDICINE

## 2023-02-01 PROCEDURE — G0009 ADMIN PNEUMOCOCCAL VACCINE: HCPCS | Performed by: FAMILY MEDICINE

## 2023-02-01 PROCEDURE — 80061 LIPID PANEL: CPT | Performed by: FAMILY MEDICINE

## 2023-02-01 PROCEDURE — 90677 PCV20 VACCINE IM: CPT | Performed by: FAMILY MEDICINE

## 2023-02-01 PROCEDURE — G0402 INITIAL PREVENTIVE EXAM: HCPCS | Performed by: FAMILY MEDICINE

## 2023-02-01 PROCEDURE — 36415 COLL VENOUS BLD VENIPUNCTURE: CPT | Performed by: FAMILY MEDICINE

## 2023-02-01 NOTE — PROGRESS NOTES
"The ABCs of the Annual Wellness Visit  Subsequent Medicare Wellness Visit    Subjective    Lori Noe is a 67 y.o. female who presents for a Subsequent Medicare Wellness Visit.    The following portions of the patient's history were reviewed and   updated as appropriate: {history reviewed:20406::\"allergies\",\"current medications\",\"past family history\",\"past medical history\",\"past social history\",\"past surgical history\",\"problem list\"}.    Compared to one year ago, the patient feels her physical   health is {better worse same:45200}.    Compared to one year ago, the patient feels her mental   health is {better worse same:78766}.    Recent Hospitalizations:  {Hospital Admission Status in the last 365 days:78367}      Current Medical Providers:  Patient Care Team:  Manuel Mcdaniel MD as PCP - General (Family Medicine)    Outpatient Medications Prior to Visit   Medication Sig Dispense Refill   • acetaminophen (TYLENOL) 500 MG tablet Take 500 mg by mouth Every Morning.     • aspirin 81 MG EC tablet Aspir-81 81 mg oral tablet,delayed release (DR/EC) take 1 tablet (81 mg) by oral route once daily   Active     • cetirizine (zyrTEC) 10 MG tablet Take 10 mg by mouth Daily As Needed.     • fluticasone (Flonase Sensimist) 27.5 MCG/SPRAY nasal spray Flonase Sensimist 27.5 mcg/actuation nasal spray,suspension inhale 1 puff by nasal route daily   Active     • Unable to find 1 each 1 (One) Time. Rohto Med Name 1 drop in each eye every day       No facility-administered medications prior to visit.       No opioid medication identified on active medication list. I have reviewed chart for other potential  high risk medication/s and harmful drug interactions in the elderly.          Aspirin is on active medication list. {ASPIRIN ON MEDICATION LIST INDICATED/NOT INDICATED:31715}.      Patient Active Problem List   Diagnosis   • Allergic rhinitis   • Thyroid nodule   • Easy bruising   • Thyroid disorder screen   • " "Screening for deficiency anemia   • Screening cholesterol level   • Screening for diabetes mellitus     Advance Care Planning  Advance Directive is not on file.  {ACP Discussion, Advance Directive not in EMR:94668}     Objective    Vitals:    02/01/23 1320   BP: 121/79   BP Location: Left arm   Patient Position: Sitting   Pulse: 80   Weight: 61.7 kg (136 lb)   Height: 170.2 cm (67\")     Estimated body mass index is 21.3 kg/m² as calculated from the following:    Height as of this encounter: 170.2 cm (67\").    Weight as of this encounter: 61.7 kg (136 lb).    BMI is within normal parameters. No other follow-up for BMI required.      Does the patient have evidence of cognitive impairment? {Yes/No:59508}          HEALTH RISK ASSESSMENT    Smoking Status:  Social History     Tobacco Use   Smoking Status Former   • Packs/day: 1.00   • Years: 20.00   • Pack years: 20.00   • Types: Cigarettes   • Quit date: 2/1/2013   • Years since quitting: 10.0   Smokeless Tobacco Never     Alcohol Consumption:  Social History     Substance and Sexual Activity   Alcohol Use Not Currently     Fall Risk Screen:    STEADI Fall Risk Assessment was completed, and patient is at LOW risk for falls.Assessment completed on:2/1/2023    Depression Screening:  PHQ-2/PHQ-9 Depression Screening 2/1/2023   Little Interest or Pleasure in Doing Things 0-->not at all   Feeling Down, Depressed or Hopeless 0-->not at all   PHQ-9: Brief Depression Severity Measure Score 0       Health Habits and Functional and Cognitive Screening:  Functional & Cognitive Status 2/1/2023   Do you have difficulty preparing food and eating? No   Do you have difficulty bathing yourself, getting dressed or grooming yourself? No   Do you have difficulty using the toilet? No   Do you have difficulty moving around from place to place? No   Do you have trouble with steps or getting out of a bed or a chair? No   Current Diet Well Balanced Diet   Dental Exam Up to date   Eye Exam Up to " date   Exercise (times per week) 0 times per week   Current Exercises Include No Regular Exercise   Do you need help using the phone?  No   Are you deaf or do you have serious difficulty hearing?  No   Do you need help with transportation? No   Do you need help shopping? No   Do you need help preparing meals?  No   Do you need help with housework?  No   Do you need help with laundry? No   Do you need help taking your medications? No   Do you need help managing money? No   Do you ever drive or ride in a car without wearing a seat belt? No   Have you felt unusual stress, anger or loneliness in the last month? Yes   Who do you live with? Spouse   If you need help, do you have trouble finding someone available to you? No   Have you been bothered in the last four weeks by sexual problems? No   Do you have difficulty concentrating, remembering or making decisions? No       Age-appropriate Screening Schedule:  Refer to the list below for future screening recommendations based on patient's age, sex and/or medical conditions. Orders for these recommended tests are listed in the plan section. The patient has been provided with a written plan.    Health Maintenance   Topic Date Due   • DXA SCAN  Never done   • TDAP/TD VACCINES (1 - Tdap) Never done   • ZOSTER VACCINE (1 of 2) Never done   • MAMMOGRAM  05/17/2023 (Originally 9/11/2021)   • INFLUENZA VACCINE  Completed                CMS Preventative Services Quick Reference  Risk Factors Identified During Encounter  {Medicare Wellness Risk Factors:52584}  The above risks/problems have been discussed with the patient.  Pertinent information has been shared with the patient in the After Visit Summary.  An After Visit Summary and PPPS were made available to the patient.    Follow Up:   Next Medicare Wellness visit to be scheduled in 1 year.   {Wrapup  Review (Popup)  Advance Care Planning  Labs  CC  Problem List  Visit Diagnosis  Medications  Result Review  Imaging   "Health Maintenance  Quality  BestPractice  SmartSets  SnapShot  Encounters  Notes  Media  Procedures :23}    Additional E&M Note during same encounter follows:  Patient has multiple medical problems which are significant and separately identifiable that require additional work above and beyond the Medicare Wellness Visit.      Chief Complaint  Medicare Wellness-Initial Visit    Subjective    {Problem List  Visit Diagnosis   Encounters  Notes  Medications  Labs  Result Review Imaging  Media :23}    VAPE    Y  NICOTINE    CT   SCHED    NODULE   R IS    CHOL  REPEAT   DECLINES MEDS     COL    COLOGARD    P 20   SHINGLES    YES AND PHARMACY    WRISTS   CTS   R NODULE    PALPITATIONS OCC WITH ANXIETY    NO MOR MAMMOGRAMS    Lori Noe is also being seen today for ***         Objective   Vital Signs:  /79 (BP Location: Left arm, Patient Position: Sitting)   Pulse 80   Ht 170.2 cm (67\")   Wt 61.7 kg (136 lb)   BMI 21.30 kg/m²     Physical Exam     {The following data was reviewed by (Optional):68932}  {Ambulatory Labs (Optional):16690}  {Data reviewed (Optional):77809:::1}           Assessment and Plan {CC Problem List  Visit Diagnosis   ROS  Review (Popup)  [facility]  Quality  BestPractice  Medications  SmartSets  SnapShot Encounters  Media :23}  There are no diagnoses linked to this encounter.       {Time Spent (Optional):76261}  Follow Up {Instructions Charge Capture  Follow-up Communications :23}  No follow-ups on file.  Patient was given instructions and counseling regarding her condition or for health maintenance advice. Please see specific information pulled into the AVS if appropriate.         "

## 2023-02-01 NOTE — ASSESSMENT & PLAN NOTE
Lipid abnormalities are newly identified.  Nutritional counseling was provided.  Lipids will be reassessed in 1 year.

## 2023-02-01 NOTE — ASSESSMENT & PLAN NOTE
ADVICE GIVEN RE:  SEATBELT USE, ALCOHOL USE, HEALTHY DIET, ROUTINE EYE AND DENTAL EXAM, ROUTINE VACCINATIONS.    WILL GIVE A PREVNAR-20 AND SHE WILL CHECK WITH HER PHARMACY ABOUT A SHINGRX  WILL ARRANGE A CHEST CT AND A COLOGARD

## 2023-02-01 NOTE — PROGRESS NOTES
The ABCs of the Annual Wellness Visit  Revelo to Medicare Visit    Subjective     Lori Noe is a 67 y.o. female who presents for a  Welcome to Medicare Visit.    The following portions of the patient's history were reviewed and   updated as appropriate: allergies, current medications, past family history, past medical history, past social history, past surgical history and problem list.     Compared to one year ago, the patient feels her physical   health is the same.    Compared to one year ago, the patient feels her mental   health is the same.    Recent Hospitalizations:  She was not admitted to the hospital during the last year.       Current Medical Providers:  Patient Care Team:  Manuel Mcdaniel MD as PCP - General (Family Medicine)    Outpatient Medications Prior to Visit   Medication Sig Dispense Refill   • acetaminophen (TYLENOL) 500 MG tablet Take 500 mg by mouth Every Morning.     • aspirin 81 MG EC tablet Aspir-81 81 mg oral tablet,delayed release (DR/EC) take 1 tablet (81 mg) by oral route once daily   Active     • cetirizine (zyrTEC) 10 MG tablet Take 10 mg by mouth Daily As Needed.     • fluticasone (Flonase Sensimist) 27.5 MCG/SPRAY nasal spray Flonase Sensimist 27.5 mcg/actuation nasal spray,suspension inhale 1 puff by nasal route daily   Active     • Unable to find 1 each 1 (One) Time. [facility] Med Name 1 drop in each eye every day       No facility-administered medications prior to visit.       No opioid medication identified on active medication list. I have reviewed chart for other potential  high risk medication/s and harmful drug interactions in the elderly.          Aspirin is on active medication list. Aspirin use is indicated based on review of current medical condition/s. Pros and cons of this therapy have been discussed today. Benefits of this medication outweigh potential harm.  Patient has been encouraged to continue taking this medication.  .      Patient Active  "Problem List   Diagnosis   • Other allergic rhinitis   • High cholesterol (declines medication)    • Palpitations (occasional, brief)    • Carpal tunnel syndrome, bilateral   • Welcome to Medicare preventive visit   • Colonoscopy declined (Cologard in 2023)    • Mammogram declined   • Subcutaneous nodule of right upper extremity   • Nodule of left lobe of thyroid gland   • Further Covid vaccinations declined by patient     Advance Care Planning  Advance Directive is not on file.  ACP discussion was held with the patient during this visit. Patient has an advance directive (not in EMR), copy requested.       Objective   Vitals:    02/01/23 1320   BP: 121/79   BP Location: Left arm   Patient Position: Sitting   Pulse: 80   Weight: 61.7 kg (136 lb)   Height: 170.2 cm (67\")     Estimated body mass index is 21.3 kg/m² as calculated from the following:    Height as of this encounter: 170.2 cm (67\").    Weight as of this encounter: 61.7 kg (136 lb).    BMI is within normal parameters. No other follow-up for BMI required.      Does the patient have evidence of cognitive impairment?   No         Procedures       HEALTH RISK ASSESSMENT    Smoking Status:  Social History     Tobacco Use   Smoking Status Former   • Packs/day: 1.00   • Years: 20.00   • Pack years: 20.00   • Types: Cigarettes   • Quit date: 2/1/2013   • Years since quitting: 10.0   Smokeless Tobacco Never     Alcohol Consumption:  Social History     Substance and Sexual Activity   Alcohol Use Not Currently       Fall Risk Screen:    JUSTIN Fall Risk Assessment was completed, and patient is at LOW risk for falls.Assessment completed on:2/1/2023    Depression Screen:   PHQ-2/PHQ-9 Depression Screening 2/1/2023   Little Interest or Pleasure in Doing Things 0-->not at all   Feeling Down, Depressed or Hopeless 0-->not at all   PHQ-9: Brief Depression Severity Measure Score 0       Health Habits and Functional and Cognitive Screening:  Functional & Cognitive Status " 2/1/2023   Do you have difficulty preparing food and eating? No   Do you have difficulty bathing yourself, getting dressed or grooming yourself? No   Do you have difficulty using the toilet? No   Do you have difficulty moving around from place to place? No   Do you have trouble with steps or getting out of a bed or a chair? No   Current Diet Well Balanced Diet   Dental Exam Up to date   Eye Exam Up to date   Exercise (times per week) 0 times per week   Current Exercises Include No Regular Exercise   Do you need help using the phone?  No   Are you deaf or do you have serious difficulty hearing?  No   Do you need help with transportation? No   Do you need help shopping? No   Do you need help preparing meals?  No   Do you need help with housework?  No   Do you need help with laundry? No   Do you need help taking your medications? No   Do you need help managing money? No   Do you ever drive or ride in a car without wearing a seat belt? No   Have you felt unusual stress, anger or loneliness in the last month? Yes   Who do you live with? Spouse   If you need help, do you have trouble finding someone available to you? No   Have you been bothered in the last four weeks by sexual problems? No   Do you have difficulty concentrating, remembering or making decisions? No       Visual Acuity:    No results found.    Age-appropriate Screening Schedule:  Refer to the list below for future screening recommendations based on patient's age, sex and/or medical conditions. Orders for these recommended tests are listed in the plan section. The patient has been provided with a written plan.    Health Maintenance   Topic Date Due   • DXA SCAN  Never done   • TDAP/TD VACCINES (1 - Tdap) Never done   • ZOSTER VACCINE (1 of 2) Never done   • MAMMOGRAM  05/17/2023 (Originally 9/11/2021)   • LIPID PANEL  05/17/2023   • INFLUENZA VACCINE  Completed        CMS Preventative Services Quick Reference  Risk Factors Identified During Encounter    None  "Identified  The above risks/problems have been discussed with the patient.  Pertinent information has been shared with the patient in the After Visit Summary.    Follow Up:   Initial Medicare Visit in one year    An After Visit Summary and PPPS were made available to the patient.      Additional E&M Note during same encounter follows:  Patient has multiple medical problems which are significant and separately identifiable that require additional work above and beyond the Medicare Wellness Visit.      Chief Complaint  Medicare Wellness-Initial Visit    Subjective        --LAST CHOL WAS > 200, HAS BEEN EXERCISING AND DIETING  --OCCASIONAL EPISODES OF ANXIETY ACCOMPANIED BY PALPITATIONS, NO OTHER ANGINAL SYMPTOMS, NO SYMPTOMS WITHOUT ALSO FEELING ANXIOUS  --BILATERAL WRIST PAIN WITH NUMBNESS / TINGLING IN THE FINGERS S/P PRIOR FRACTURES, ALSO HAS A NODULE ON THE RIGHT WRIST  --LEFT THYROID NODULE PER U/S IN 2019  --DUE FOR A PREVNAR-20, SHINGRX, SCREENING CHEST CT AND COLON CANCER SCREENING       Lori Noe is also being seen today for WRIST PAIN, PALPITATIONS    Review of Systems   Constitutional: Negative for activity change, appetite change, chills, fatigue and fever.   HENT: Negative for congestion, ear pain, hearing loss, rhinorrhea and sore throat.    Eyes: Negative for discharge and visual disturbance.   Respiratory: Negative for cough and shortness of breath.    Cardiovascular: Negative for chest pain and leg swelling.   Gastrointestinal: Negative for abdominal pain, diarrhea, nausea and vomiting.   Genitourinary: Negative for dysuria and hematuria.   Musculoskeletal: Negative for arthralgias and myalgias.   Psychiatric/Behavioral: Negative for dysphoric mood.       Objective   Vital Signs:  /79 (BP Location: Left arm, Patient Position: Sitting)   Pulse 80   Ht 170.2 cm (67\")   Wt 61.7 kg (136 lb)   BMI 21.30 kg/m²     Physical Exam  Vitals and nursing note reviewed.   Constitutional:       " General: She is not in acute distress.     Appearance: Normal appearance.   HENT:      Right Ear: Tympanic membrane normal.      Left Ear: Tympanic membrane normal.      Mouth/Throat:      Pharynx: Oropharynx is clear.   Eyes:      Conjunctiva/sclera: Conjunctivae normal.   Cardiovascular:      Rate and Rhythm: Normal rate and regular rhythm.      Pulses: Normal pulses.      Heart sounds: Normal heart sounds. No murmur heard.  Pulmonary:      Effort: Pulmonary effort is normal.      Breath sounds: Normal breath sounds.   Abdominal:      General: Bowel sounds are normal.      Palpations: Abdomen is soft.      Tenderness: There is no abdominal tenderness.   Musculoskeletal:      Cervical back: Neck supple.      Right lower leg: No edema.      Left lower leg: No edema.      Comments: BOTH WRISTS WITH GOOD ROM.  ONE X ONE CM FREELY MOBILE SUB-Q NODULE ON THE EXTENSOR SURFACE ON THE RIGHT WRIST     Lymphadenopathy:      Cervical: No cervical adenopathy.   Neurological:      General: No focal deficit present.      Mental Status: She is alert.      Cranial Nerves: No cranial nerve deficit.      Coordination: Coordination normal.      Gait: Gait normal.   Psychiatric:         Mood and Affect: Mood normal.         Behavior: Behavior normal.                      Assessment and Plan   Diagnoses and all orders for this visit:    1. Welcome to Medicare preventive visit (Primary)  Assessment & Plan:  ADVICE GIVEN RE:  SEATBELT USE, ALCOHOL USE, HEALTHY DIET, ROUTINE EYE AND DENTAL EXAM, ROUTINE VACCINATIONS.    WILL GIVE A PREVNAR-20 AND SHE WILL CHECK WITH HER PHARMACY ABOUT A SHINGRX  WILL ARRANGE A CHEST CT AND A COLOGARD       2. High cholesterol  Assessment & Plan:  Lipid abnormalities are newly identified.  Nutritional counseling was provided.  Lipids will be reassessed in 1 year.    Orders:  -     Comprehensive Metabolic Panel  -     Lipid Panel    3. Palpitations  Assessment & Plan:  LABS AS NOTED, OBSERVE FOR NOW BUT  CONSIDER HOLTER, ECHO, CARDIOLOGY EVAL    Orders:  -     CBC (No Diff)  -     TSH Rfx On Abnormal To Free T4    4. Immunization due  -     Pneumococcal Conjugate Vaccine 20-Valent All    5. Carpal tunnel syndrome, bilateral  -     Ambulatory Referral to Hand Surgery    6. Colon cancer screening  -     Cologuard - Stool, Per Rectum; Future    7. History of nicotine dependence  -     CT Chest Low Dose Wo; Future    8. Nodule of left lobe of thyroid gland  -     US Thyroid; Future    9. Subcutaneous nodule of right upper extremity  -     Ambulatory Referral to Hand Surgery           Follow Up   Return in about 1 year (around 2/1/2024).  Patient was given instructions and counseling regarding her condition or for health maintenance advice. Please see specific information pulled into the AVS if appropriate.

## 2023-02-14 ENCOUNTER — HOSPITAL ENCOUNTER (OUTPATIENT)
Dept: ULTRASOUND IMAGING | Facility: HOSPITAL | Age: 68
Discharge: HOME | End: 2023-02-14
Payer: MEDICARE

## 2023-02-14 ENCOUNTER — HOSPITAL ENCOUNTER (OUTPATIENT)
Dept: CT IMAGING | Facility: HOSPITAL | Age: 68
Discharge: HOME | End: 2023-02-14
Payer: MEDICARE

## 2023-02-14 DIAGNOSIS — E04.1 NODULE OF LEFT LOBE OF THYROID GLAND: ICD-10-CM

## 2023-02-14 DIAGNOSIS — Z87.891 HISTORY OF NICOTINE DEPENDENCE: ICD-10-CM

## 2023-02-14 PROCEDURE — 76536 US EXAM OF HEAD AND NECK: CPT

## 2023-02-14 PROCEDURE — 71271 CT THORAX LUNG CANCER SCR C-: CPT

## 2023-06-07 ENCOUNTER — TELEPHONE (OUTPATIENT)
Dept: FAMILY MEDICINE CLINIC | Age: 68
End: 2023-06-07
Payer: MEDICARE

## 2023-06-07 NOTE — TELEPHONE ENCOUNTER
Received a order for a EKG to be done for [facility].  Fax results to them at [phone].     Dr Mcdaniel is it okay to do these orders?

## 2023-06-07 NOTE — TELEPHONE ENCOUNTER
Caller: Lori Noe    Relationship: Self    Best call back number: [phone]     What is the best time to reach you: ANYTIME     Who are you requesting to speak with (clinical staff, provider,  specific staff member): CLINICAL         What was the call regarding: PATIENT IS CALLING TO CHECK THE STATUS OF THE ABOVE ENCOUNTER, APOLOGIES IN ADVANCE Radha Velez LPN , TO ADD NOTATION TO SIGNED ENCOUNTER. PATIENT IS ADVISED A MESSAGE WAS SENT BACK IN REGARDS TO THE ORDER.

## 2023-06-09 ENCOUNTER — TRANSCRIBE ORDERS (OUTPATIENT)
Dept: FAMILY MEDICINE CLINIC | Age: 68
End: 2023-06-09
Payer: MEDICARE

## 2023-06-09 DIAGNOSIS — I10 HYPERTENSION, UNSPECIFIED TYPE: Primary | ICD-10-CM

## 2023-06-16 ENCOUNTER — CLINICAL SUPPORT (OUTPATIENT)
Dept: FAMILY MEDICINE CLINIC | Age: 68
End: 2023-06-16
Payer: MEDICARE

## 2023-06-16 DIAGNOSIS — Z01.818 PRE-OP TESTING: Primary | ICD-10-CM

## 2023-06-16 NOTE — PROGRESS NOTES
Procedure     ECG 12 Lead    Date/Time: 6/16/2023 4:57 PM  Performed by: Manuel Mcdaniel MD  Authorized by: Manuel Mcdaniel MD   Comparison: not compared with previous ECG   Rhythm: sinus rhythm  Rate: normal  Conduction: conduction normal  ST Segments: ST segments normal  T Waves: T waves normal  QRS axis: normal    Clinical impression: normal ECG

## 2023-11-07 ENCOUNTER — CLINICAL SUPPORT (OUTPATIENT)
Dept: FAMILY MEDICINE CLINIC | Age: 68
End: 2023-11-07
Payer: MEDICARE

## 2023-11-07 DIAGNOSIS — Z23 NEED FOR INFLUENZA VACCINATION: Primary | ICD-10-CM

## 2023-11-14 ENCOUNTER — OFFICE VISIT (OUTPATIENT)
Dept: FAMILY MEDICINE CLINIC | Age: 68
End: 2023-11-14
Payer: MEDICARE

## 2023-11-14 VITALS
HEIGHT: 67 IN | SYSTOLIC BLOOD PRESSURE: 124 MMHG | TEMPERATURE: 97.5 F | HEART RATE: 76 BPM | BODY MASS INDEX: 22.7 KG/M2 | WEIGHT: 144.6 LBS | DIASTOLIC BLOOD PRESSURE: 66 MMHG | OXYGEN SATURATION: 99 %

## 2023-11-14 DIAGNOSIS — R05.1 ACUTE COUGH: ICD-10-CM

## 2023-11-14 DIAGNOSIS — R09.81 NASAL CONGESTION: ICD-10-CM

## 2023-11-14 DIAGNOSIS — J01.00 ACUTE NON-RECURRENT MAXILLARY SINUSITIS: ICD-10-CM

## 2023-11-14 DIAGNOSIS — J02.9 SORE THROAT: Primary | ICD-10-CM

## 2023-11-14 PROCEDURE — 87428 SARSCOV & INF VIR A&B AG IA: CPT | Performed by: NURSE PRACTITIONER

## 2023-11-14 PROCEDURE — 1159F MED LIST DOCD IN RCRD: CPT | Performed by: NURSE PRACTITIONER

## 2023-11-14 PROCEDURE — 1160F RVW MEDS BY RX/DR IN RCRD: CPT | Performed by: NURSE PRACTITIONER

## 2023-11-14 PROCEDURE — 87081 CULTURE SCREEN ONLY: CPT | Performed by: NURSE PRACTITIONER

## 2023-11-14 PROCEDURE — 87880 STREP A ASSAY W/OPTIC: CPT | Performed by: NURSE PRACTITIONER

## 2023-11-14 PROCEDURE — 99214 OFFICE O/P EST MOD 30 MIN: CPT | Performed by: NURSE PRACTITIONER

## 2023-11-14 RX ORDER — AMOXICILLIN 875 MG/1
875 TABLET, COATED ORAL 2 TIMES DAILY
Qty: 20 TABLET | Refills: 0 | Status: SHIPPED | OUTPATIENT
Start: 2023-11-14 | End: 2023-11-24

## 2023-11-14 RX ORDER — BROMPHENIRAMINE MALEATE, PSEUDOEPHEDRINE HYDROCHLORIDE, AND DEXTROMETHORPHAN HYDROBROMIDE 2; 30; 10 MG/5ML; MG/5ML; MG/5ML
5 SYRUP ORAL NIGHTLY PRN
Qty: 240 ML | Refills: 0 | Status: SHIPPED | OUTPATIENT
Start: 2023-11-14

## 2023-11-14 RX ORDER — GUAIFENESIN 600 MG/1
1200 TABLET, EXTENDED RELEASE ORAL 2 TIMES DAILY
Qty: 20 TABLET | Refills: 1 | Status: SHIPPED | OUTPATIENT
Start: 2023-11-14

## 2023-11-14 NOTE — PROGRESS NOTES
"Chief Complaint  Sore Throat (Sx started ), Nasal Congestion, Earache, and URI    Subjective        Lori Noe presents to [facility]  Sore Throat   This is a new problem. The current episode started in the past 7 days. The problem has been gradually worsening. The maximum temperature recorded prior to her arrival was 101 - 101.9 F. Associated symptoms include congestion, coughing, ear pain, headaches, a plugged ear sensation and neck pain. Pertinent negatives include no shortness of breath. She has had no exposure to strep. She has tried acetaminophen for the symptoms. The treatment provided moderate relief.   Earache   There is pain in both ears. This is a new problem. The current episode started in the past 7 days. The problem has been gradually worsening. The maximum temperature recorded prior to her arrival was 101 - 101.9 F. Associated symptoms include coughing, headaches, neck pain and a sore throat. She has tried acetaminophen for the symptoms. The treatment provided moderate relief.   URI   This is a new problem. The current episode started 1 to 4 weeks ago. The problem has been gradually worsening. The maximum temperature recorded prior to her arrival was 101 - 101.9 F. Associated symptoms include congestion, coughing, ear pain, headaches, neck pain, a plugged ear sensation, sinus pain and a sore throat. Pertinent negatives include no chest pain. She has tried acetaminophen for the symptoms. The treatment provided mild relief.       Objective   Vital Signs:  /66 (BP Location: Right arm, Patient Position: Sitting, Cuff Size: Adult)   Pulse 76   Temp 97.5 °F (36.4 °C) (Temporal)   Ht 170.2 cm (67\")   Wt 65.6 kg (144 lb 9.6 oz)   SpO2 99% Comment: room air  BMI 22.65 kg/m²   Estimated body mass index is 22.65 kg/m² as calculated from the following:    Height as of this encounter: 170.2 cm (67\").    Weight as of this encounter: 65.6 kg (144 lb 9.6 oz).   "     BMI is within normal parameters. No other follow-up for BMI required.      Physical Exam  HENT:      Head: Normocephalic.      Right Ear: A middle ear effusion is present.      Left Ear: A middle ear effusion is present.      Nose: Congestion present.      Right Sinus: Maxillary sinus tenderness and frontal sinus tenderness present.      Left Sinus: Maxillary sinus tenderness and frontal sinus tenderness present.      Mouth/Throat:      Pharynx: Posterior oropharyngeal erythema present.   Cardiovascular:      Rate and Rhythm: Normal rate and regular rhythm.   Pulmonary:      Effort: Pulmonary effort is normal. No respiratory distress.      Breath sounds: Normal breath sounds. No stridor. No wheezing, rhonchi or rales.   Skin:     General: Skin is warm and dry.   Neurological:      Mental Status: She is alert and oriented to person, place, and time.   Psychiatric:         Mood and Affect: Mood normal.        Result Review :          Results for orders placed or performed in visit on 11/14/23   POCT rapid strep A    Specimen: Swab   Result Value Ref Range    Rapid Strep A Screen Negative Negative, VALID, INVALID, Not Performed    Internal Control Passed Passed    Lot Number [id number]     Expiration Date 03/31/25    POCT SARS-CoV-2 Antigen GINA + Flu    Specimen: Swab   Result Value Ref Range    SARS Antigen Not Detected Not Detected, Presumptive Negative    Influenza A Antigen GINA Not Detected Not Detected    Influenza B Antigen GINA Not Detected Not Detected    Internal Control Passed Passed    Lot Number [id number]     Expiration Date 09/01/24               Assessment and Plan   Diagnoses and all orders for this visit:    1. Sore throat (Primary)  -     POCT rapid strep A  -     Beta Strep Culture, Throat - , Throat; Future  -     Beta Strep Culture, Throat - Swab, Throat    2. Nasal congestion  -     POCT SARS-CoV-2 Antigen GINA + Flu  -     guaiFENesin (Mucinex) 600 MG 12 hr tablet; Take 2 tablets by mouth 2 (Two)  Times a Day.  Dispense: 20 tablet; Refill: 1    3. Acute non-recurrent maxillary sinusitis  Comments:  Resume zyrtec, force fluids, tylenol as needed, F/U in one week if no improvment, sooner for worsening  Orders:  -     amoxicillin (AMOXIL) 875 MG tablet; Take 1 tablet by mouth 2 (Two) Times a Day for 10 days.  Dispense: 20 tablet; Refill: 0    4. Acute cough  -     brompheniramine-pseudoephedrine-DM 30-2-10 MG/5ML syrup; Take 5 mL by mouth At Night As Needed for Congestion or Cough.  Dispense: 240 mL; Refill: 0             Follow Up   Return if symptoms worsen or fail to improve.  Patient was given instructions and counseling regarding her condition or for health maintenance advice. Please see specific information pulled into the AVS if appropriate.

## 2023-11-16 LAB — BACTERIA SPEC AEROBE CULT: NORMAL

## 2023-12-13 ENCOUNTER — OFFICE VISIT (OUTPATIENT)
Dept: FAMILY MEDICINE CLINIC | Age: 68
End: 2023-12-13
Payer: MEDICARE

## 2023-12-13 VITALS
WEIGHT: 146 LBS | HEART RATE: 73 BPM | SYSTOLIC BLOOD PRESSURE: 110 MMHG | OXYGEN SATURATION: 94 % | DIASTOLIC BLOOD PRESSURE: 68 MMHG | HEIGHT: 67 IN | BODY MASS INDEX: 22.91 KG/M2

## 2023-12-13 DIAGNOSIS — Z23 ENCOUNTER FOR IMMUNIZATION: ICD-10-CM

## 2023-12-13 DIAGNOSIS — S61.012A LACERATION OF LEFT THUMB WITHOUT FOREIGN BODY WITHOUT DAMAGE TO NAIL, INITIAL ENCOUNTER: Primary | ICD-10-CM

## 2023-12-13 NOTE — PROGRESS NOTES
"Chief Complaint  Laceration (Laceration on L thumb x 1 day )    Subjective          Lori Noe presents to [facility] today for laceration of the left thumb that occurred 1 day ago.  She is accompanied today by her  Kiran.    She was working with an electric screwdriver yesterday afternoon which slipped and gouged out \"a little flap\" from the pad.  Tool was not noticeably dirty.  She flushed out the wound with copious tap water, including lifting the flap and washing it out underneath.  She is due for Td (unsure when her last tetanus vaccine was done).        Current Outpatient Medications:     acetaminophen (TYLENOL) 500 MG tablet, Take 1 tablet by mouth Every Morning., Disp: , Rfl:     cetirizine (zyrTEC) 10 MG tablet, Take 1 tablet by mouth Daily As Needed., Disp: , Rfl:     fluticasone (Flonase Sensimist) 27.5 MCG/SPRAY nasal spray, Flonase Sensimist 27.5 mcg/actuation nasal spray,suspension inhale 1 puff by nasal route daily   Active, Disp: , Rfl:     guaiFENesin (Mucinex) 600 MG 12 hr tablet, Take 2 tablets by mouth 2 (Two) Times a Day. (Patient taking differently: Take 2 tablets by mouth As Needed.), Disp: 20 tablet, Rfl: 1    Unable to find, 1 each 1 (One) Time. Rohto Med Name 1 drop in each eye every day, Disp: , Rfl:   Medications Discontinued During This Encounter   Medication Reason    aspirin 81 MG EC tablet Historical Med - Therapy completed    brompheniramine-pseudoephedrine-DM 30-2-10 MG/5ML syrup *Therapy completed         Allergies:  Motrin [ibuprofen]      Objective   Vital Signs:   Vitals:    12/13/23 1107   BP: 110/68   BP Location: Left arm   Patient Position: Sitting   Cuff Size: Adult   Pulse: 73   SpO2: 94%   Weight: 66.2 kg (146 lb)   Height: 170.2 cm (67.01\")         Physical Exam  Constitutional:       Appearance: Normal appearance.   HENT:      Head: Normocephalic and atraumatic.   Eyes:      Extraocular Movements: Extraocular movements " intact.      Conjunctiva/sclera: Conjunctivae normal.   Pulmonary:      Effort: Pulmonary effort is normal. No respiratory distress.   Musculoskeletal:         General: Normal range of motion.   Skin:     General: Skin is warm and dry.      Comments: Laceration on the pad of the L thumb starting just lateral to the nail (but not involving the nail) and running proximal then distal up along the lateral edge of the thumb pad.  Some maceration of the tissue but no erythema.  Wound edges are tightly closed together today even with gentle pressure at both edges of the laceration no drainage or purulence.  Minimal tenderness to palpation   Neurological:      General: No focal deficit present.      Mental Status: She is alert and oriented to person, place, and time.   Psychiatric:         Mood and Affect: Mood normal.         Behavior: Behavior normal.         Thought Content: Thought content normal.         Judgment: Judgment normal.           Lab Results   Component Value Date    GLUCOSE 95 02/01/2023    BUN 6 (L) 02/01/2023    CREATININE 0.85 02/01/2023    BCR 7.1 02/01/2023    K 3.9 02/01/2023    CO2 28.9 02/01/2023    CALCIUM 8.9 02/01/2023    ALBUMIN 4.3 02/01/2023    LABIL2 1.7 01/17/2020    AST 22 02/01/2023    ALT 16 02/01/2023       Lab Results   Component Value Date    CHOL 262 (H) 02/01/2023    TRIG 187 (H) 02/01/2023    HDL 42 02/01/2023     (H) 02/01/2023            Assessment and Plan    Diagnoses and all orders for this visit:    1. Laceration of left thumb without foreign body without damage to nail, initial encounter (Primary)  Assessment & Plan:  Laceration of the left thumb pad occurred approximately 21 hours ago.  At the time of the laceration, Lori flushed the area copiously with tap water including down into the wound itself and then applied a pressure wrap with Band-Aids and antibiotic ointment.  Today the wound does appear to have sealed up fairly well so I do not feel that suturing would  add much at this point.  I am going to apply some Steri-Strips to give it some additional integrity and have applied antibiotic ointment.  I would like her to continue antibiotic ointment with her Band-Aids over top during the day but leave off the antibiotic ointment at night to allow some of that maceration to dry out.  Td was given today.  Warning signs and symptoms of cellulitis were reviewed and she is to call back to clinic should these occur.  If she was to develop a cellulitis of the area, I would plan to send her in Keflex 500 mg twice daily for 7 days.  All questions answered.  Call or return to clinic prn worsening or failure to improve of symptoms.    Orders:  -     Td Vaccine => 8yo PF (TDVAX) 2-2    2. Encounter for immunization  -     Td Vaccine => 8yo PF (TDVAX) 2-2        Follow Up   Return if symptoms worsen or fail to improve.  Patient was given instructions and counseling regarding her condition or for health maintenance advice. Please see specific information pulled into the AVS if appropriate.           12/13/2023

## 2023-12-13 NOTE — ASSESSMENT & PLAN NOTE
Laceration of the left thumb pad occurred approximately 21 hours ago.  At the time of the laceration, Lori flushed the area copiously with tap water including down into the wound itself and then applied a pressure wrap with Band-Aids and antibiotic ointment.  Today the wound does appear to have sealed up fairly well so I do not feel that suturing would add much at this point.  I am going to apply some Steri-Strips to give it some additional integrity and have applied antibiotic ointment.  I would like her to continue antibiotic ointment with her Band-Aids over top during the day but leave off the antibiotic ointment at night to allow some of that maceration to dry out.  Td was given today.  Warning signs and symptoms of cellulitis were reviewed and she is to call back to clinic should these occur.  If she was to develop a cellulitis of the area, I would plan to send her in Keflex 500 mg twice daily for 7 days.  All questions answered.  Call or return to clinic prn worsening or failure to improve of symptoms.

## 2024-02-07 ENCOUNTER — OFFICE VISIT (OUTPATIENT)
Dept: FAMILY MEDICINE CLINIC | Age: 69
End: 2024-02-07
Payer: MEDICARE

## 2024-02-07 VITALS
WEIGHT: 142.4 LBS | HEIGHT: 67 IN | DIASTOLIC BLOOD PRESSURE: 73 MMHG | HEART RATE: 74 BPM | SYSTOLIC BLOOD PRESSURE: 114 MMHG | BODY MASS INDEX: 22.35 KG/M2

## 2024-02-07 DIAGNOSIS — R00.2 PALPITATIONS: ICD-10-CM

## 2024-02-07 DIAGNOSIS — Z87.891 HISTORY OF NICOTINE DEPENDENCE: ICD-10-CM

## 2024-02-07 DIAGNOSIS — E04.1 NODULE OF LEFT LOBE OF THYROID GLAND: ICD-10-CM

## 2024-02-07 DIAGNOSIS — Z00.00 MEDICARE ANNUAL WELLNESS VISIT, SUBSEQUENT: Primary | ICD-10-CM

## 2024-02-07 DIAGNOSIS — E78.00 HIGH CHOLESTEROL: ICD-10-CM

## 2024-02-07 DIAGNOSIS — G56.03 CARPAL TUNNEL SYNDROME, BILATERAL: ICD-10-CM

## 2024-02-07 PROBLEM — S61.012A LACERATION OF LEFT THUMB WITHOUT FOREIGN BODY WITHOUT DAMAGE TO NAIL: Status: RESOLVED | Noted: 2023-12-13 | Resolved: 2024-02-07

## 2024-02-07 PROBLEM — Z72.0 VAPES NICOTINE CONTAINING SUBSTANCE: Status: ACTIVE | Noted: 2024-02-07

## 2024-02-07 NOTE — PROGRESS NOTES
The ABCs of the Annual Wellness Visit  Subsequent Medicare Wellness Visit    Subjective    Lori Noe is a 68 y.o. female who presents for a Subsequent Medicare Wellness Visit.    The following portions of the patient's history were reviewed and   updated as appropriate: allergies, current medications, past family history, past medical history, past social history, past surgical history, and problem list.    Compared to one year ago, the patient feels her physical   health is the same.    Compared to one year ago, the patient feels her mental   health is the same.    Recent Hospitalizations:  She was not admitted to the hospital during the last year.       Current Medical Providers:  Patient Care Team:  Manuel Mcdaniel MD as PCP - General (Family Medicine)    Outpatient Medications Prior to Visit   Medication Sig Dispense Refill    acetaminophen (TYLENOL) 500 MG tablet Take 1 tablet by mouth Every Morning.      cetirizine (zyrTEC) 10 MG tablet Take 1 tablet by mouth Daily As Needed.      fluticasone (Flonase Sensimist) 27.5 MCG/SPRAY nasal spray Flonase Sensimist 27.5 mcg/actuation nasal spray,suspension inhale 1 puff by nasal route daily   Active      guaiFENesin (Mucinex) 600 MG 12 hr tablet Take 2 tablets by mouth 2 (Two) Times a Day. (Patient taking differently: Take 2 tablets by mouth As Needed.) 20 tablet 1    Unable to find 1 each 1 (One) Time. [facility]o Med Name 1 drop in each eye every day       No facility-administered medications prior to visit.       No opioid medication identified on active medication list. I have reviewed chart for other potential  high risk medication/s and harmful drug interactions in the elderly.        Aspirin is not on active medication list.  Aspirin use is not indicated based on review of current medical condition/s. Risk of harm outweighs potential benefits.  .    Patient Active Problem List   Diagnosis    Other allergic rhinitis    High cholesterol (declines  "medication)     Palpitations (occasional, brief, negative cadiac workup in the past)    Carpal tunnel syndrome, bilateral    Colonoscopy declined (negative Cologard in )    Mammogram declined    Subcutaneous nodule of right upper extremity    Nodule of left lobe of thyroid gland    Further Covid vaccinations declined by patient    Medicare annual wellness visit, subsequent    Vapes nicotine containing substance     Advance Care Planning   Advance Care Planning     Advance Directive is not on file.  ACP discussion was held with the patient during this visit. Patient does not have an advance directive, declines further assistance.     Objective    Vitals:    24 1351   BP: 114/73   BP Location: Left arm   Patient Position: Sitting   Pulse: 74   Weight: 64.6 kg (142 lb 6.4 oz)   Height: 170.2 cm (67\")   PainSc:   2   PainLoc: Wrist     Estimated body mass index is 22.3 kg/m² as calculated from the following:    Height as of this encounter: 170.2 cm (67\").    Weight as of this encounter: 64.6 kg (142 lb 6.4 oz).    BMI is within normal parameters. No other follow-up for BMI required.      Does the patient have evidence of cognitive impairment? No          HEALTH RISK ASSESSMENT    Smoking Status:  Social History     Tobacco Use   Smoking Status Former    Packs/day: 1.00    Years: 22.00    Additional pack years: 0.00    Total pack years: 22.00    Types: Cigarettes    Quit date: 2013    Years since quittin.0   Smokeless Tobacco Never     Alcohol Consumption:  Social History     Substance and Sexual Activity   Alcohol Use Not Currently     Fall Risk Screen:    ASMITAADI Fall Risk Assessment was completed, and patient is at LOW risk for falls.Assessment completed on:2024    Depression Screenin/7/2024     1:50 PM   PHQ-2/PHQ-9 Depression Screening   Little Interest or Pleasure in Doing Things 1-->several days   Feeling Down, Depressed or Hopeless 1-->several days   PHQ-9: Brief Depression Severity " Measure Score 2       Health Habits and Functional and Cognitive Screenin/7/2024     1:50 PM   Functional & Cognitive Status   Do you have difficulty preparing food and eating? No   Do you have difficulty bathing yourself, getting dressed or grooming yourself? No   Do you have difficulty using the toilet? No   Do you have difficulty moving around from place to place? No   Do you have trouble with steps or getting out of a bed or a chair? No   Current Diet Other   Dental Exam Up to date   Eye Exam Up to date   Exercise (times per week) 0 times per week   Current Exercises Include No Regular Exercise   Do you need help using the phone?  No   Are you deaf or do you have serious difficulty hearing?  No   Do you need help to go to places out of walking distance? No   Do you need help shopping? No   Do you need help preparing meals?  No   Do you need help with housework?  No   Do you need help with laundry? No   Do you need help taking your medications? No   Do you need help managing money? No   Do you ever drive or ride in a car without wearing a seat belt? No   Have you felt unusual stress, anger or loneliness in the last month? Yes   Who do you live with? Spouse   If you need help, do you have trouble finding someone available to you? No   Do you have difficulty concentrating, remembering or making decisions? No       Age-appropriate Screening Schedule:  Refer to the list below for future screening recommendations based on patient's age, sex and/or medical conditions. Orders for these recommended tests are listed in the plan section. The patient has been provided with a written plan.    Health Maintenance   Topic Date Due    DXA SCAN  Never done    HEPATITIS C SCREENING  Never done    MAMMOGRAM  2021    COVID-19 Vaccine (2023-24 season) 2023    LIPID PANEL  2024    RSV Vaccine - Adults (1 - 1-dose 60+ series) 2025 (Originally 2017)    ZOSTER VACCINE (1 of 2) 2025  (Originally 12/28/2007)    LUNG CANCER SCREENING  02/14/2024    ANNUAL WELLNESS VISIT  02/07/2025    COLORECTAL CANCER SCREENING  02/14/2026    TDAP/TD VACCINES (2 - Tdap) 12/13/2033    INFLUENZA VACCINE  Completed    Pneumococcal Vaccine 65+  Completed                  CMS Preventative Services Quick Reference  Risk Factors Identified During Encounter  None Identified  The above risks/problems have been discussed with the patient.  Pertinent information has been shared with the patient in the After Visit Summary.  An After Visit Summary and PPPS were made available to the patient.    Follow Up:   Next Medicare Wellness visit to be scheduled in 1 year.       Additional E&M Note during same encounter follows:  Patient has multiple medical problems which are significant and separately identifiable that require additional work above and beyond the Medicare Wellness Visit.      Chief Complaint  Medicare Wellness-subsequent    Subjective        --LAST CHOL WAS ? 250, SHE HAS CONSISTENTLY DECLINED MEDS FOR THIS CONDITION  --DUE FOR A REPEAT THYROID U/S DUE TO HAVING A NODULE AND DUE FOR A REPEAT SCREENING CHEST CT DUE TO HER HISTORY OF SMOKING  --BILATERAL CTS, WILL HAVE SURGERY ON THE RIGHT WRIST SOON  --STILL ONLY OCCASIONAL PALPITATIONS, NEGATIVE CARDIAC W/U IN THE PAST      Lori Noe is also being seen today for CHOLESTEROL, THYROID    Review of Systems   Constitutional:  Negative for activity change, appetite change, chills, fatigue and fever.   HENT:  Negative for congestion, ear pain, hearing loss, rhinorrhea and sore throat.    Eyes:  Negative for discharge and visual disturbance.   Respiratory:  Negative for cough and shortness of breath.    Cardiovascular:  Negative for chest pain, palpitations and leg swelling.   Gastrointestinal:  Negative for abdominal pain, diarrhea, nausea and vomiting.   Genitourinary:  Negative for dysuria and hematuria.   Musculoskeletal:  Positive for arthralgias. Negative  "for myalgias.   Psychiatric/Behavioral:  Negative for dysphoric mood.        Objective   Vital Signs:  /73 (BP Location: Left arm, Patient Position: Sitting)   Pulse 74   Ht 170.2 cm (67\")   Wt 64.6 kg (142 lb 6.4 oz)   BMI 22.30 kg/m²     Physical Exam  Vitals and nursing note reviewed.   Constitutional:       General: She is not in acute distress.     Appearance: Normal appearance.   HENT:      Right Ear: Tympanic membrane normal.      Left Ear: Tympanic membrane normal.      Mouth/Throat:      Pharynx: Oropharynx is clear.   Eyes:      Conjunctiva/sclera: Conjunctivae normal.   Cardiovascular:      Rate and Rhythm: Normal rate and regular rhythm.      Heart sounds: Normal heart sounds. No murmur heard.  Pulmonary:      Effort: Pulmonary effort is normal.      Breath sounds: Normal breath sounds.   Abdominal:      General: Bowel sounds are normal.      Palpations: Abdomen is soft.      Tenderness: There is no abdominal tenderness.   Musculoskeletal:      Cervical back: Neck supple.      Right lower leg: No edema.      Left lower leg: No edema.   Lymphadenopathy:      Cervical: No cervical adenopathy.   Neurological:      General: No focal deficit present.      Mental Status: She is alert.      Cranial Nerves: No cranial nerve deficit.      Coordination: Coordination normal.      Gait: Gait normal.   Psychiatric:         Mood and Affect: Mood normal.         Behavior: Behavior normal.          The following data was reviewed by: Manuel Mcdaniel MD on 02/07/2024:               Assessment and Plan   Diagnoses and all orders for this visit:    1. Medicare annual wellness visit, subsequent (Primary)  Assessment & Plan:  ADVICE GIVEN RE:  SEATBELT USE, ALCOHOL USE, HEALTHY DIET, ROUTINE EYE AND DENTAL EXAM, ROUTINE VACCINATIONS.        2. Nodule of left lobe of thyroid gland  -     US Thyroid    3. Palpitations (occasional, brief)   Assessment & Plan:  IMPROVED WITH CURRENT TREATMENT, CONTINUE SAME, " WILL REEVALUATE AT NEXT VISIT       4. High cholesterol (declines medication)   Assessment & Plan:  Lipid abnormalities are unchanged.  Nutritional counseling was provided.  Lipids will be reassessed in 1 year  [facility] .      5. Carpal tunnel syndrome, bilateral  Assessment & Plan:  NO IMPROVEMENT, PROCEED WITH SURGERY AS PLANNED       6. History of nicotine dependence  -     CT Chest Low Dose Wo; Future             Follow Up   Return in about 6 months (around 8/7/2024).  Patient was given instructions and counseling regarding her condition or for health maintenance advice. Please see specific information pulled into the AVS if appropriate.

## 2024-02-07 NOTE — ASSESSMENT & PLAN NOTE
Lipid abnormalities are unchanged.  Nutritional counseling was provided.  Lipids will be reassessed in 1 year  [facility] .

## 2024-02-23 ENCOUNTER — OFFICE VISIT (OUTPATIENT)
Dept: FAMILY MEDICINE CLINIC | Age: 69
End: 2024-02-23
Payer: MEDICARE

## 2024-02-23 VITALS
SYSTOLIC BLOOD PRESSURE: 117 MMHG | DIASTOLIC BLOOD PRESSURE: 78 MMHG | HEIGHT: 67 IN | HEART RATE: 76 BPM | WEIGHT: 145.2 LBS | BODY MASS INDEX: 22.79 KG/M2 | TEMPERATURE: 97.6 F

## 2024-02-23 DIAGNOSIS — L60.0 INGROWN RIGHT GREATER TOENAIL: Primary | ICD-10-CM

## 2024-02-23 DIAGNOSIS — L03.031 CELLULITIS OF GREAT TOE, RIGHT: ICD-10-CM

## 2024-02-23 PROBLEM — Z00.00 MEDICARE ANNUAL WELLNESS VISIT, SUBSEQUENT: Status: RESOLVED | Noted: 2024-02-07 | Resolved: 2024-02-23

## 2024-02-23 PROCEDURE — 1160F RVW MEDS BY RX/DR IN RCRD: CPT | Performed by: FAMILY MEDICINE

## 2024-02-23 PROCEDURE — 1159F MED LIST DOCD IN RCRD: CPT | Performed by: FAMILY MEDICINE

## 2024-02-23 PROCEDURE — 99213 OFFICE O/P EST LOW 20 MIN: CPT | Performed by: FAMILY MEDICINE

## 2024-02-23 RX ORDER — CEPHALEXIN 500 MG/1
500 CAPSULE ORAL 3 TIMES DAILY
Qty: 30 CAPSULE | Refills: 0 | Status: SHIPPED | OUTPATIENT
Start: 2024-02-23

## 2024-02-23 NOTE — PROGRESS NOTES
Chief Complaint  Toe Pain (Right big toe pain x 5 or 6 days. )    Subjective          Lori Noe presents to [facility]  History of Present Illness  --INGROWN RIGHT GREAT TOENAIL FOR THE PAST FEW WEEKS.  NOW HAS DEVELOPED PAIN AND PURULENT DRAINAGE.  NO CONSITUTIONAL SYMPTOMS.          Allergies   Allergen Reactions    Motrin [Ibuprofen] GI Intolerance        Health Maintenance Due   Topic Date Due    DXA SCAN  Never done    HEPATITIS C SCREENING  Never done    MAMMOGRAM  09/11/2021    COVID-19 Vaccine (4 - 2023-24 season) 09/01/2023    LIPID PANEL  02/01/2024    LUNG CANCER SCREENING  02/14/2024        Current Outpatient Medications on File Prior to Visit   Medication Sig    acetaminophen (TYLENOL) 500 MG tablet Take 1 tablet by mouth Every Morning.    cetirizine (zyrTEC) 10 MG tablet Take 1 tablet by mouth Daily As Needed.    fluticasone (Flonase Sensimist) 27.5 MCG/SPRAY nasal spray Flonase Sensimist 27.5 mcg/actuation nasal spray,suspension inhale 1 puff by nasal route daily   Active    guaiFENesin (Mucinex) 600 MG 12 hr tablet Take 2 tablets by mouth 2 (Two) Times a Day. (Patient taking differently: Take 2 tablets by mouth As Needed.)    Unable to find 1 each 1 (One) Time. [facility]o Med Name 1 drop in each eye every day (Patient not taking: Reported on 2/23/2024)     No current facility-administered medications on file prior to visit.       Immunization History   Administered Date(s) Administered    COVID-19 (PFIZER) Purple Cap Monovalent 03/24/2021, 04/14/2021, 01/31/2022    Fluzone (or Fluarix & Flulaval for VFC) >6mos 11/02/2021, 11/07/2022    Fluzone High-Dose 65+yrs 11/07/2023    Influenza Quad Vaccine (Inpatient) 09/27/2013, 10/04/2017    Influenza, Unspecified 11/07/2022    Pneumococcal Conjugate 20-Valent (PCV20) 02/01/2023    Td (TDVAX) 12/13/2023       Review of Systems   Constitutional:  Negative for activity change, appetite change, chills, fatigue and fever.  "  HENT:  Negative for congestion, ear pain, rhinorrhea and sore throat.    Respiratory:  Negative for cough and shortness of breath.    Cardiovascular:  Negative for chest pain, palpitations and leg swelling.   Gastrointestinal:  Negative for abdominal pain, constipation, diarrhea, nausea and vomiting.   Musculoskeletal:  Negative for arthralgias and myalgias.   Neurological:  Negative for headache.        Objective     /78 (BP Location: Right arm, Patient Position: Sitting, Cuff Size: Large Adult)   Pulse 76   Temp 97.6 °F (36.4 °C) (Oral)   Ht 170.2 cm (67\")   Wt 65.9 kg (145 lb 3.2 oz)   BMI 22.74 kg/m²       Physical Exam  Vitals and nursing note reviewed.   Constitutional:       General: She is not in acute distress.     Appearance: Normal appearance.   Pulmonary:      Effort: Pulmonary effort is normal.   Musculoskeletal:      Right lower leg: No edema.      Left lower leg: No edema.      Comments: RIGHT GREAT TOE WITH FULL ROM AND SENSATION.  DP PULSE IS OK.  TENDER MEDIAL NAIL FOLD WITH EDEMA, ERYTHEMA AND SOME PURULENT DRAINAGE.     Neurological:      General: No focal deficit present.      Mental Status: She is alert.      Cranial Nerves: No cranial nerve deficit.      Coordination: Coordination normal.      Gait: Gait normal.   Psychiatric:         Mood and Affect: Mood normal.         Behavior: Behavior normal.         Result Review :                             Assessment and Plan      Diagnoses and all orders for this visit:    1. Ingrown right greater toenail (Primary)  -     Ambulatory Referral to Podiatry    2. Cellulitis of great toe, right  Comments:  WILL COVER AS NOTED.  WARM SOAKS, ELEVATION, OTC MEDS PRN, SEE PODIATRY AS PLANNED  Orders:  -     cephalexin (Keflex) 500 MG capsule; Take 1 capsule by mouth 3 (Three) Times a Day.  Dispense: 30 capsule; Refill: 0            Follow Up     Return if symptoms worsen or fail to improve.    Patient was given instructions and counseling " regarding her condition or for health maintenance advice. Please see specific information pulled into the AVS if appropriate.

## 2024-02-26 ENCOUNTER — HOSPITAL ENCOUNTER (OUTPATIENT)
Dept: ULTRASOUND IMAGING | Facility: HOSPITAL | Age: 69
Discharge: HOME | End: 2024-02-26
Payer: MEDICARE

## 2024-02-26 ENCOUNTER — HOSPITAL ENCOUNTER (OUTPATIENT)
Dept: CT IMAGING | Facility: HOSPITAL | Age: 69
Discharge: HOME | End: 2024-02-26
Payer: MEDICARE

## 2024-02-26 DIAGNOSIS — Z87.891 HISTORY OF NICOTINE DEPENDENCE: ICD-10-CM

## 2024-02-26 PROCEDURE — 76536 US EXAM OF HEAD AND NECK: CPT

## 2024-02-26 PROCEDURE — 71271 CT THORAX LUNG CANCER SCR C-: CPT

## 2024-03-07 ENCOUNTER — OFFICE VISIT (OUTPATIENT)
Dept: PODIATRY | Facility: CLINIC | Age: 69
End: 2024-03-07
Payer: MEDICARE

## 2024-03-07 VITALS
WEIGHT: 143 LBS | SYSTOLIC BLOOD PRESSURE: 122 MMHG | OXYGEN SATURATION: 97 % | BODY MASS INDEX: 22.44 KG/M2 | HEART RATE: 88 BPM | HEIGHT: 67 IN | DIASTOLIC BLOOD PRESSURE: 67 MMHG

## 2024-03-07 DIAGNOSIS — L03.031 PARONYCHIA OF TOE OF RIGHT FOOT: ICD-10-CM

## 2024-03-07 DIAGNOSIS — L60.0 ONYCHOCRYPTOSIS: Primary | ICD-10-CM

## 2024-03-07 DIAGNOSIS — M79.671 FOOT PAIN, RIGHT: ICD-10-CM

## 2024-03-07 NOTE — LETTER
March 7, 2024       No Recipients    Patient: Lori Noe   YOB: 1955   Date of Visit: 3/7/2024       Dear Manuel Mcdaniel MD:    Thank you for referring Lori Noe to me for evaluation. Below are the relevant portions of my assessment and plan of care.    Encounter Diagnosis and Orders:  Diagnoses and all orders for this visit:    1. Onychocryptosis (Primary)    2. Paronychia of toe of right foot    3. Foot pain, right        If you have questions, please do not hesitate to call me. I look forward to following Lori along with you.         Sincerely,        Dago Olsen DPM        CC:   No Recipients

## 2024-03-07 NOTE — PROGRESS NOTES
[facility] - PODIATRY    Today's Date: 24    Patient Name: Lori Noe  MRN: [id number]  CSN: [id number]  PCP: Manuel Mcdaniel MD  Referring Provider: Manuel Mcdaniel*    SUBJECTIVE     Chief Complaint   Patient presents with    Right Foot - Establish Care, Ingrown Toenail     Finished abx, feels better  Soaking in apple cider vinegar      HPI: Lori Noe, a 68 y.o.female, comes to clinic.    New, Established, New Problem: New problem  Location: Lateral border of the right first toe  Duration:   Recently  Onset: Started after a pedicure  Nature:  sore with palpation.  Aggravating factors:  Pain with shoe gear and ambulation.  Previous Treatment: Soaking    No other pedal complaints at this time.    Patient denies any fevers, chills, nausea, vomiting, shortness of breath, nor any other constitutional signs nor symptoms.       Past Medical History:   Diagnosis Date    Ingrown toenail     Skin cancer      Past Surgical History:   Procedure Laterality Date     SECTION      x 2    FOOT FRACTURE SURGERY Left     age 28    HYSTERECTOMY  age 36    one ovary remains    SALIVARY GLAND EXCISION Left     SKIN CANCER EXCISION      WRIST FRACTURE SURGERY Bilateral     age 28     Family History   Problem Relation Age of Onset    Lung cancer Mother     Breast cancer Mother      Social History     Socioeconomic History    Marital status:    Tobacco Use    Smoking status: Former     Current packs/day: 0.00     Average packs/day: 1 pack/day for 22.0 years (22.0 ttl pk-yrs)     Types: Cigarettes     Start date: 1991     Quit date: 2013     Years since quittin.1    Smokeless tobacco: Never   Vaping Use    Vaping status: Every Day    Substances: Nicotine, Flavoring    Devices: Refillable tank   Substance and Sexual Activity    Alcohol use: Not Currently    Drug use: Never    Sexual activity: Defer     Allergies   Allergen Reactions    Motrin [Ibuprofen]  GI Intolerance     Current Outpatient Medications   Medication Sig Dispense Refill    acetaminophen (TYLENOL) 500 MG tablet Take 1 tablet by mouth Every Morning.      cetirizine (zyrTEC) 10 MG tablet Take 1 tablet by mouth Daily As Needed.      fluticasone (Flonase Sensimist) 27.5 MCG/SPRAY nasal spray Flonase Sensimist 27.5 mcg/actuation nasal spray,suspension inhale 1 puff by nasal route daily   Active      guaiFENesin (Mucinex) 600 MG 12 hr tablet Take 2 tablets by mouth 2 (Two) Times a Day. (Patient taking differently: Take 2 tablets by mouth As Needed.) 20 tablet 1    Unable to find 1 each 1 (One) Time. Rohto Med Name 1 drop in each eye every day       No current facility-administered medications for this visit.     Review of Systems   Constitutional: Negative.    Skin:         Painful Right in-grown toenail   All other systems reviewed and are negative.      OBJECTIVE     Vitals:    03/07/24 0833   BP: 122/67   Pulse: 88   SpO2: 97%       PHYSICAL EXAM  GEN:   Accompanied by .     Foot/Ankle Exam    GENERAL  Appearance:  appears stated age, elderly and healthy  Orientation:  AAOx3  Affect:  appropriate  Gait:  unimpaired  Assistance:  independent  Right shoe gear: casual shoe  Left shoe gear: casual shoe    VASCULAR     Right Foot Vascularity   Dorsalis pedis:  2+  Posterior tibial:  2+  Skin temperature:  warm  Edema grading:  None  CFT:  < 3 seconds  Pedal hair growth:  Absent  Varicosities:  mild varicosities     Left Foot Vascularity   Dorsalis pedis:  2+  Posterior tibial:  2+  Skin temperature:  warm  Edema grading:  None  CFT:  < 3 seconds  Pedal hair growth:  Absent  Varicosities:  mild varicosities     NEUROLOGIC     Right Foot Neurologic   Normal sensation    Light touch sensation: normal  Vibratory sensation: normal  Hot/Cold sensation: normal     Left Foot Neurologic   Normal sensation    Light touch sensation: normal  Vibratory sensation: normal  Hot/Cold sensation:   normal    MUSCULOSKELETAL     Right Foot Musculoskeletal   Tenderness:  toe 1 tenderness      MUSCLE STRENGTH     Right Foot Muscle Strength   Foot dorsiflexion:  4  Foot plantar flexion:  4  Foot inversion:  4  Foot eversion:  4     Left Foot Muscle Strength   Foot dorsiflexion:  4  Foot plantar flexion:  4  Foot inversion:  4  Foot eversion:  4    RANGE OF MOTION     Right Foot Range of Motion   Foot and ankle ROM within normal limits       Left Foot Range of Motion   Foot and ankle ROM within normal limits      DERMATOLOGIC      Right Foot Dermatologic   Skin  Right foot skin is intact.   Nails  1.  Positive for ingrown toenail and paronychia. (Lateral nail border)  Nails comment:  Right 1st lateral nail border     Left Foot Dermatologic   Skin  Left foot skin is intact.   Nails comment:  Toenails 1, 2, 3, 4, and 5      ASSESSMENT/PLAN     Diagnoses and all orders for this visit:    1. Onychocryptosis (Primary)    2. Paronychia of toe of right foot    3. Foot pain, right      Comprehensive lower extremity examination and evaluation was performed.    Discussed findings and treatment plan including risks, benefits, and treatment options with patient in detail. Patient agreed with treatment plan.    Phenol and Alcohol Chemical Matrixectomy Procedure - This procedure is indicated for onychocryptosis of the right first lateral nail border(s). Indications, risks and benefits and alternative treatments have been discussed with this patient who has agreed to this procedure. The area was sterilely prepped with a povidone-iodine solution. The affected area was locally anesthetized with 3 ml, of 0.5% Marcaine plain. The offending nail plate was completely excised.  Next 3 applications of 89% phenol were applied to the matrix area x 30 seconds followed by irrigation with copious amounts of isopropyl alcohol.  A sterile dressing was applied. The patient tolerated the procedure well.     Patient was given post procedure care  instructions.  The patient states understanding and agreement with this plan.    An After Visit Summary was printed and given to the patient at discharge, including (if requested) any available informative/educational handouts regarding diagnosis, treatment, or medications. All questions were answered to patient/family satisfaction. Should symptoms fail to improve or worsen they agree to call or return to clinic or to go to the Emergency Department. Discussed the importance of following up with any needed screening tests/labs/specialist appointments and any requested follow-up recommended by me today. Importance of maintaining follow-up discussed and patient accepts that missed appointments can delay diagnosis and potentially lead to worsening of conditions.    Return in about 2 weeks (around 3/21/2024) for Post-Procedure., or sooner if acute issues arise.    This document has been electronically signed by Dago Olsen DPM on March 7, 2024 08:59 EST

## 2024-03-21 ENCOUNTER — OFFICE VISIT (OUTPATIENT)
Dept: PODIATRY | Facility: CLINIC | Age: 69
End: 2024-03-21
Payer: MEDICARE

## 2024-03-21 VITALS
BODY MASS INDEX: 22.6 KG/M2 | OXYGEN SATURATION: 96 % | HEART RATE: 86 BPM | SYSTOLIC BLOOD PRESSURE: 130 MMHG | HEIGHT: 67 IN | WEIGHT: 144 LBS | TEMPERATURE: 98 F | DIASTOLIC BLOOD PRESSURE: 86 MMHG

## 2024-03-21 DIAGNOSIS — M79.671 FOOT PAIN, RIGHT: ICD-10-CM

## 2024-03-21 DIAGNOSIS — L60.0 ONYCHOCRYPTOSIS: Primary | ICD-10-CM

## 2024-03-21 DIAGNOSIS — L03.031 PARONYCHIA OF TOE OF RIGHT FOOT: ICD-10-CM

## 2024-03-21 NOTE — PROGRESS NOTES
[facility] - PODIATRY    Today's Date: 24    Patient Name: Lori Noe  MRN: [id number]  CSN: [id number]  PCP: Manuel Mcdaniel MD  Referring Provider: No ref. provider found    SUBJECTIVE     Chief Complaint   Patient presents with    Right Foot - Ingrown Toenail, Follow-up     HPI: Lori Noe, a 68 y.o.female, comes to clinic.    New, Established, New Problem: Established  Location: Lateral border of the right first toe  Duration:   Recently  Onset: Started after a pedicure  Nature:  sore with palpation.  Aggravating factors:  Pain with shoe gear and ambulation.  Previous Treatment: Chemical matrixectomy    Patient denies any fevers, chills, nausea, vomiting, shortness of breath, nor any other constitutional signs nor symptoms.       Past Medical History:   Diagnosis Date    Ingrown toenail     Skin cancer      Past Surgical History:   Procedure Laterality Date     SECTION      x 2    FOOT FRACTURE SURGERY Left     age 28    HYSTERECTOMY  age 36    one ovary remains    SALIVARY GLAND EXCISION Left     SKIN CANCER EXCISION      WRIST FRACTURE SURGERY Bilateral     age 28     Family History   Problem Relation Age of Onset    Lung cancer Mother     Breast cancer Mother      Social History     Socioeconomic History    Marital status:    Tobacco Use    Smoking status: Former     Current packs/day: 0.00     Average packs/day: 1 pack/day for 22.0 years (22.0 ttl pk-yrs)     Types: Cigarettes     Start date: 1991     Quit date: 2013     Years since quittin.1    Smokeless tobacco: Never   Vaping Use    Vaping status: Every Day    Substances: Nicotine, Flavoring    Devices: Refillable tank   Substance and Sexual Activity    Alcohol use: Not Currently    Drug use: Never    Sexual activity: Defer     Allergies   Allergen Reactions    Motrin [Ibuprofen] GI Intolerance     Current Outpatient Medications   Medication Sig Dispense Refill    acetaminophen  (TYLENOL) 500 MG tablet Take 1 tablet by mouth Every Morning.      cetirizine (zyrTEC) 10 MG tablet Take 1 tablet by mouth Daily As Needed.      fluticasone (Flonase Sensimist) 27.5 MCG/SPRAY nasal spray Flonase Sensimist 27.5 mcg/actuation nasal spray,suspension inhale 1 puff by nasal route daily   Active      guaiFENesin (Mucinex) 600 MG 12 hr tablet Take 2 tablets by mouth 2 (Two) Times a Day. (Patient taking differently: Take 2 tablets by mouth As Needed.) 20 tablet 1    Unable to find 1 each 1 (One) Time. Rohto Med Name 1 drop in each eye every day       No current facility-administered medications for this visit.     Review of Systems   Constitutional: Negative.    Skin:         F/U Painful Right in-grown toenail   All other systems reviewed and are negative.      OBJECTIVE     Vitals:    03/21/24 1344   BP: 130/86   Pulse: 86   Temp: 98 °F (36.7 °C)   SpO2: 96%       PHYSICAL EXAM  GEN:   Accompanied by .     Foot/Ankle Exam    GENERAL  Appearance:  appears stated age, elderly and healthy  Orientation:  AAOx3  Affect:  appropriate  Gait:  unimpaired  Assistance:  independent  Right shoe gear: casual shoe  Left shoe gear: casual shoe    VASCULAR     Right Foot Vascularity   Dorsalis pedis:  2+  Posterior tibial:  2+  Skin temperature:  warm  Edema grading:  None  CFT:  < 3 seconds  Pedal hair growth:  Absent  Varicosities:  mild varicosities     Left Foot Vascularity   Dorsalis pedis:  2+  Posterior tibial:  2+  Skin temperature:  warm  Edema grading:  None  CFT:  < 3 seconds  Pedal hair growth:  Absent  Varicosities:  mild varicosities     NEUROLOGIC     Right Foot Neurologic   Normal sensation    Light touch sensation: normal  Vibratory sensation: normal  Hot/Cold sensation: normal     Left Foot Neurologic   Normal sensation    Light touch sensation: normal  Vibratory sensation: normal  Hot/Cold sensation:  normal    MUSCLE STRENGTH     Right Foot Muscle Strength   Foot dorsiflexion:  4  Foot plantar  flexion:  4  Foot inversion:  4  Foot eversion:  4     Left Foot Muscle Strength   Foot dorsiflexion:  4  Foot plantar flexion:  4  Foot inversion:  4  Foot eversion:  4    RANGE OF MOTION     Right Foot Range of Motion   Foot and ankle ROM within normal limits       Left Foot Range of Motion   Foot and ankle ROM within normal limits      DERMATOLOGIC      Right Foot Dermatologic   Skin  Right foot skin is intact.   Nails  1.  Negative for ingrown toenail and paronychia. (Lateral nail border; healing without complications.)  Nails comment:  Right 1st lateral nail border     Left Foot Dermatologic   Skin  Left foot skin is intact.   Nails comment:  Toenails 1, 2, 3, 4, and 5    ASSESSMENT/PLAN     Diagnoses and all orders for this visit:    1. Onychocryptosis (Primary)    2. Paronychia of toe of right foot    3. Foot pain, right      Comprehensive lower extremity examination and evaluation was performed.    Discussed findings and treatment plan including risks, benefits, and treatment options with patient in detail. Patient agreed with treatment plan.    Patient is to monitor for recurrence and any new symptoms and to contact Dr. Olsen's office for a follow-up appointment.      The patient states understanding and agreement with this plan.    An After Visit Summary was printed and given to the patient at discharge, including (if requested) any available informative/educational handouts regarding diagnosis, treatment, or medications. All questions were answered to patient/family satisfaction. Should symptoms fail to improve or worsen they agree to call or return to clinic or to go to the Emergency Department. Discussed the importance of following up with any needed screening tests/labs/specialist appointments and any requested follow-up recommended by me today. Importance of maintaining follow-up discussed and patient accepts that missed appointments can delay diagnosis and potentially lead to worsening of  conditions.    Return if symptoms worsen or fail to improve., or sooner if acute issues arise.    This document has been electronically signed by Dago Olsen DPM on March 21, 2024 14:14 EDT

## 2024-04-15 ENCOUNTER — OFFICE VISIT (OUTPATIENT)
Dept: FAMILY MEDICINE CLINIC | Age: 69
End: 2024-04-15
Payer: MEDICARE

## 2024-04-15 VITALS
BODY MASS INDEX: 22.76 KG/M2 | HEART RATE: 77 BPM | TEMPERATURE: 98.3 F | HEIGHT: 67 IN | SYSTOLIC BLOOD PRESSURE: 115 MMHG | WEIGHT: 145 LBS | OXYGEN SATURATION: 98 % | DIASTOLIC BLOOD PRESSURE: 63 MMHG

## 2024-04-15 DIAGNOSIS — H66.92 LEFT OTITIS MEDIA, UNSPECIFIED OTITIS MEDIA TYPE: ICD-10-CM

## 2024-04-15 DIAGNOSIS — R05.1 ACUTE COUGH: Primary | ICD-10-CM

## 2024-04-15 DIAGNOSIS — R09.81 NASAL CONGESTION: ICD-10-CM

## 2024-04-15 PROCEDURE — 87428 SARSCOV & INF VIR A&B AG IA: CPT | Performed by: NURSE PRACTITIONER

## 2024-04-15 PROCEDURE — 1159F MED LIST DOCD IN RCRD: CPT | Performed by: NURSE PRACTITIONER

## 2024-04-15 PROCEDURE — 1160F RVW MEDS BY RX/DR IN RCRD: CPT | Performed by: NURSE PRACTITIONER

## 2024-04-15 PROCEDURE — 99213 OFFICE O/P EST LOW 20 MIN: CPT | Performed by: NURSE PRACTITIONER

## 2024-04-15 RX ORDER — CEFDINIR 300 MG/1
300 CAPSULE ORAL 2 TIMES DAILY
Qty: 14 CAPSULE | Refills: 0 | Status: SHIPPED | OUTPATIENT
Start: 2024-04-15 | End: 2024-04-22

## 2024-04-15 NOTE — TELEPHONE ENCOUNTER
Caller: Lori Noe    Relationship: Self    Best call back number: [phone]    Requested Prescriptions:   Requested Prescriptions     Pending Prescriptions Disp Refills    cefdinir (OMNICEF) 300 MG capsule 14 capsule 0     Sig: Take 1 capsule by mouth 2 (Two) Times a Day for 7 days.        Pharmacy where request should be sent: [id number] - [place], [place] - [address] AT [facility] [address] [facility] - [phone] [facility] [phone] FX     Last office visit with prescribing clinician: 2/23/2024   Last telemedicine visit with prescribing clinician: Visit date not found   Next office visit with prescribing clinician: 8/22/2024     Additional details provided by patient: PATIENT HAS BEEN INFORMED FROM PHARMACY THAT THEY DO NOT HAVE THIS MEDICATION FROM PROVIDER. PATIENT IS ASKING FOR THIS TO BE RESENT TO THEM, AND A CALL TO LET HER KNOW.    Would you like a call back once the refill request has been completed: [x] Yes [] No    If the office needs to give you a call back, can they leave a voicemail: [x] Yes [] No    Kim Lyon Rep   04/15/24 17:15 EDT

## 2024-04-15 NOTE — PROGRESS NOTES
"Chief Complaint  Cough (Patient c/o this for 4 days. ), Nasal Congestion, and Earache    Subjective        Lori Noe presents to [facility]  Cough  This is a new problem. The current episode started 1 to 4 weeks ago. The cough is Productive of green sputum. Associated symptoms include chills, ear pain, nasal congestion, postnasal drip, rhinorrhea and sweats. Pertinent negatives include no chest pain, fever, sore throat, shortness of breath or weight loss. Treatments tried: tylenol and flonase.   Earache   There is pain in the left ear. This is a new problem. The current episode started 1 to 4 weeks ago. Associated symptoms include coughing, hearing loss and rhinorrhea. Pertinent negatives include no sore throat.       Objective   Vital Signs:  /63 (BP Location: Right arm, Patient Position: Sitting, Cuff Size: Adult)   Pulse 77   Temp 98.3 °F (36.8 °C) (Oral)   Ht 170.2 cm (67\")   Wt 65.8 kg (145 lb)   SpO2 98%   BMI 22.71 kg/m²   Estimated body mass index is 22.71 kg/m² as calculated from the following:    Height as of this encounter: 170.2 cm (67\").    Weight as of this encounter: 65.8 kg (145 lb).       BMI is within normal parameters. No other follow-up for BMI required.      Physical Exam  HENT:      Head: Normocephalic.      Right Ear: A middle ear effusion is present.      Left Ear: Decreased hearing noted. A middle ear effusion is present. Tympanic membrane is injected and erythematous.      Nose: Congestion present.      Right Sinus: Maxillary sinus tenderness present.      Left Sinus: Maxillary sinus tenderness present.      Mouth/Throat:      Pharynx: Posterior oropharyngeal erythema present.   Cardiovascular:      Rate and Rhythm: Normal rate and regular rhythm.   Pulmonary:      Effort: Pulmonary effort is normal. No respiratory distress.      Breath sounds: Normal breath sounds. No stridor. No wheezing, rhonchi or rales.   Lymphadenopathy:      " Cervical:      Left cervical: Superficial cervical adenopathy present.   Skin:     General: Skin is warm and dry.   Neurological:      Mental Status: She is alert and oriented to person, place, and time.   Psychiatric:         Mood and Affect: Mood normal.        Result Review :            Results for orders placed or performed in visit on 04/15/24   POCT SARS-CoV-2 Antigen GINA + Flu    Specimen: Swab   Result Value Ref Range    SARS Antigen Not Detected Not Detected, Presumptive Negative    Influenza A Antigen GINA Not Detected Not Detected    Influenza B Antigen GINA Not Detected Not Detected    Internal Control Passed Passed    Lot Number [id number]     Expiration Date 9/18/24               Assessment and Plan     Diagnoses and all orders for this visit:    1. Acute cough (Primary)  -     POCT SARS-CoV-2 Antigen GINA + Flu    2. Nasal congestion  -     POCT SARS-CoV-2 Antigen GINA + Flu    3. Left otitis media, unspecified otitis media type  -     cefdinir (OMNICEF) 300 MG capsule; Take 1 capsule by mouth 2 (Two) Times a Day for 7 days.  Dispense: 14 capsule; Refill: 0    Rapid testing in office negative today.  Treating based upon duration of symptoms and assessment findings.  Continue Zyrtec and Flonase.  Follow-up in 1 week if no improvement, sooner for worsening.         Follow Up     Return if symptoms worsen or fail to improve.  Patient was given instructions and counseling regarding her condition or for health maintenance advice. Please see specific information pulled into the AVS if appropriate.

## 2024-04-16 RX ORDER — CEFDINIR 300 MG/1
300 CAPSULE ORAL 2 TIMES DAILY
Qty: 14 CAPSULE | Refills: 0 | OUTPATIENT
Start: 2024-04-16 | End: 2024-04-23

## 2024-08-22 ENCOUNTER — LAB (OUTPATIENT)
Dept: LAB | Facility: HOSPITAL | Age: 69
End: 2024-08-22
Payer: MEDICARE

## 2024-08-22 ENCOUNTER — OFFICE VISIT (OUTPATIENT)
Dept: FAMILY MEDICINE CLINIC | Age: 69
End: 2024-08-22
Payer: MEDICARE

## 2024-08-22 VITALS
DIASTOLIC BLOOD PRESSURE: 72 MMHG | SYSTOLIC BLOOD PRESSURE: 127 MMHG | HEART RATE: 77 BPM | BODY MASS INDEX: 23.23 KG/M2 | HEIGHT: 67 IN | TEMPERATURE: 98.1 F | WEIGHT: 148 LBS

## 2024-08-22 DIAGNOSIS — R00.2 PALPITATIONS: ICD-10-CM

## 2024-08-22 DIAGNOSIS — Z11.59 ENCOUNTER FOR SCREENING FOR OTHER VIRAL DISEASES: ICD-10-CM

## 2024-08-22 DIAGNOSIS — E78.00 HIGH CHOLESTEROL: Primary | ICD-10-CM

## 2024-08-22 DIAGNOSIS — Z78.0 POSTMENOPAUSAL STATE: ICD-10-CM

## 2024-08-22 DIAGNOSIS — E04.1 NODULE OF LEFT LOBE OF THYROID GLAND: ICD-10-CM

## 2024-08-22 LAB
ALBUMIN SERPL-MCNC: 4.4 G/DL (ref 3.5–5.2)
ALBUMIN/GLOB SERPL: 2 G/DL
ALP SERPL-CCNC: 79 U/L (ref 39–117)
ALT SERPL W P-5'-P-CCNC: 9 U/L (ref 1–33)
ANION GAP SERPL CALCULATED.3IONS-SCNC: 9 MMOL/L (ref 5–15)
AST SERPL-CCNC: 16 U/L (ref 1–32)
BILIRUB SERPL-MCNC: 0.3 MG/DL (ref 0–1.2)
BUN SERPL-MCNC: 9 MG/DL (ref 8–23)
BUN/CREAT SERPL: 11.3 (ref 7–25)
CALCIUM SPEC-SCNC: 9.2 MG/DL (ref 8.6–10.5)
CHLORIDE SERPL-SCNC: 107 MMOL/L (ref 98–107)
CHOLEST SERPL-MCNC: 266 MG/DL (ref 0–200)
CO2 SERPL-SCNC: 25 MMOL/L (ref 22–29)
CREAT SERPL-MCNC: 0.8 MG/DL (ref 0.57–1)
DEPRECATED RDW RBC AUTO: 43.9 FL (ref 37–54)
EGFRCR SERPLBLD CKD-EPI 2021: 81.4 ML/MIN/1.73
ERYTHROCYTE [DISTWIDTH] IN BLOOD BY AUTOMATED COUNT: 12.8 % (ref 12.3–15.4)
GLOBULIN UR ELPH-MCNC: 2.2 GM/DL
GLUCOSE SERPL-MCNC: 93 MG/DL (ref 65–99)
HCT VFR BLD AUTO: 39.4 % (ref 34–46.6)
HCV AB SER QL: NORMAL
HDLC SERPL-MCNC: 43 MG/DL (ref 40–60)
HGB BLD-MCNC: 12.8 G/DL (ref 12–15.9)
LDLC SERPL CALC-MCNC: 200 MG/DL (ref 0–100)
LDLC/HDLC SERPL: 4.6 {RATIO}
MCH RBC QN AUTO: 30.3 PG (ref 26.6–33)
MCHC RBC AUTO-ENTMCNC: 32.5 G/DL (ref 31.5–35.7)
MCV RBC AUTO: 93.4 FL (ref 79–97)
PLATELET # BLD AUTO: 235 10*3/MM3 (ref 140–450)
PMV BLD AUTO: 10.2 FL (ref 6–12)
POTASSIUM SERPL-SCNC: 3.9 MMOL/L (ref 3.5–5.2)
PROT SERPL-MCNC: 6.6 G/DL (ref 6–8.5)
RBC # BLD AUTO: 4.22 10*6/MM3 (ref 3.77–5.28)
SODIUM SERPL-SCNC: 141 MMOL/L (ref 136–145)
TRIGL SERPL-MCNC: 127 MG/DL (ref 0–150)
TSH SERPL DL<=0.05 MIU/L-ACNC: 0.97 UIU/ML (ref 0.27–4.2)
VLDLC SERPL-MCNC: 23 MG/DL (ref 5–40)
WBC NRBC COR # BLD AUTO: 4.92 10*3/MM3 (ref 3.4–10.8)

## 2024-08-22 PROCEDURE — 80053 COMPREHEN METABOLIC PANEL: CPT | Performed by: FAMILY MEDICINE

## 2024-08-22 PROCEDURE — 80061 LIPID PANEL: CPT | Performed by: FAMILY MEDICINE

## 2024-08-22 PROCEDURE — 36415 COLL VENOUS BLD VENIPUNCTURE: CPT | Performed by: FAMILY MEDICINE

## 2024-08-22 PROCEDURE — 84443 ASSAY THYROID STIM HORMONE: CPT | Performed by: FAMILY MEDICINE

## 2024-08-22 PROCEDURE — 1160F RVW MEDS BY RX/DR IN RCRD: CPT | Performed by: FAMILY MEDICINE

## 2024-08-22 PROCEDURE — 1126F AMNT PAIN NOTED NONE PRSNT: CPT | Performed by: FAMILY MEDICINE

## 2024-08-22 PROCEDURE — 85027 COMPLETE CBC AUTOMATED: CPT | Performed by: FAMILY MEDICINE

## 2024-08-22 PROCEDURE — G2211 COMPLEX E/M VISIT ADD ON: HCPCS | Performed by: FAMILY MEDICINE

## 2024-08-22 PROCEDURE — 99214 OFFICE O/P EST MOD 30 MIN: CPT | Performed by: FAMILY MEDICINE

## 2024-08-22 PROCEDURE — 86803 HEPATITIS C AB TEST: CPT | Performed by: FAMILY MEDICINE

## 2024-08-22 PROCEDURE — 1159F MED LIST DOCD IN RCRD: CPT | Performed by: FAMILY MEDICINE

## 2024-08-22 NOTE — PROGRESS NOTES
Chief Complaint  Hyperlipidemia (6 months)    Subjective          Lori Noe presents to [facility]  History of Present Illness  --CHOL WAS > 260, HAS CONSISTENTLY DECLINED TREATMENT  --THYROID NODULE WAS UNCHANGED ON REPEAT U/S   --NO RECDENT PALPITATIONS  --DUE FOR A DEXA SCAN         Allergies   Allergen Reactions    Motrin [Ibuprofen] GI Intolerance        Health Maintenance Due   Topic Date Due    DXA SCAN  Never done    HEPATITIS C SCREENING  Never done    LIPID PANEL  02/01/2024    INFLUENZA VACCINE  08/01/2024        Current Outpatient Medications on File Prior to Visit   Medication Sig    acetaminophen (TYLENOL) 500 MG tablet Take 1 tablet by mouth Every Morning.    cetirizine (zyrTEC) 10 MG tablet Take 1 tablet by mouth Daily As Needed.    fluticasone (Flonase Sensimist) 27.5 MCG/SPRAY nasal spray Flonase Sensimist 27.5 mcg/actuation nasal spray,suspension inhale 1 puff by nasal route daily   Active    guaiFENesin (Mucinex) 600 MG 12 hr tablet Take 2 tablets by mouth 2 (Two) Times a Day. (Patient taking differently: Take 2 tablets by mouth As Needed.)    Unable to find 1 each 1 (One) Time. Rohto Med Name 1 drop in each eye every day     No current facility-administered medications on file prior to visit.       Immunization History   Administered Date(s) Administered    COVID-19 (PFIZER) Purple Cap Monovalent 03/24/2021, 04/14/2021, 01/31/2022    Fluzone  >6mos 09/27/2013, 10/04/2017    Fluzone (or Fluarix & Flulaval for VFC) >6mos 11/02/2021, 11/07/2022    Fluzone High-Dose 65+yrs 11/07/2023    Influenza, Unspecified 11/07/2022    Pneumococcal Conjugate 20-Valent (PCV20) 02/01/2023    Td (TDVAX) 12/13/2023       Review of Systems   Constitutional:  Negative for activity change, appetite change, chills, fatigue and fever.   HENT:  Negative for congestion, ear pain, rhinorrhea and sore throat.    Respiratory:  Negative for cough and shortness of breath.   "  Cardiovascular:  Negative for chest pain, palpitations and leg swelling.   Gastrointestinal:  Negative for abdominal pain, constipation, diarrhea, nausea and vomiting.   Musculoskeletal:  Negative for arthralgias and myalgias.   Neurological:  Negative for headache.        Objective     /72 (BP Location: Left arm, Patient Position: Sitting)   Pulse 77   Temp 98.1 °F (36.7 °C) (Oral)   Ht 170.2 cm (67\")   Wt 67.1 kg (148 lb)   BMI 23.18 kg/m²       Physical Exam  Vitals and nursing note reviewed.   Constitutional:       General: She is not in acute distress.     Appearance: Normal appearance.   Cardiovascular:      Rate and Rhythm: Normal rate and regular rhythm.      Heart sounds: Normal heart sounds. No murmur heard.  Pulmonary:      Effort: Pulmonary effort is normal.      Breath sounds: Normal breath sounds.   Abdominal:      Palpations: Abdomen is soft.      Tenderness: There is no abdominal tenderness.   Musculoskeletal:      Cervical back: Neck supple.      Right lower leg: No edema.      Left lower leg: No edema.   Lymphadenopathy:      Cervical: No cervical adenopathy.   Neurological:      General: No focal deficit present.      Mental Status: She is alert.      Cranial Nerves: No cranial nerve deficit.      Coordination: Coordination normal.      Gait: Gait normal.   Psychiatric:         Mood and Affect: Mood normal.         Behavior: Behavior normal.         Result Review :                             Assessment and Plan      Diagnoses and all orders for this visit:    1. High cholesterol (declines medication)  (Primary)  Assessment & Plan:   Lipid abnormalities are stable    Plan:  Continue same medication/s without change.      Discussed medication dosage, use, side effects, and goals of treatment in detail.    Counseled patient on lifestyle modifications to help control hyperlipidemia.     Patient Treatment Goals:   LDL goal is less than 70    Followup in 6 months  POOR CONTROL, PATIENT " CONTINUES TO DECLINE TREATMENT .    Orders:  -     Comprehensive Metabolic Panel  -     Lipid Panel    2. Nodule of left lobe of thyroid gland  Assessment & Plan:  UNCHANGED, WILL REPEAT U/S AFTER A YEAR     Orders:  -     TSH Rfx On Abnormal To Free T4    3. Palpitations (occasional, brief, negative cadiac workup in the past)  Assessment & Plan:  IMPROVED WITH CURRENT TREATMENT, CONTINUE SAME, WILL REEVALUATE AT NEXT VISIT     Orders:  -     CBC (No Diff)    4. Postmenopausal state  -     DEXA Bone Density Axial; Future    5. Encounter for screening for other viral diseases  -     Hepatitis C Antibody            Follow Up     Return in about 6 months (around 2/22/2025).    Patient was given instructions and counseling regarding her condition or for health maintenance advice. Please see specific information pulled into the AVS if appropriate.

## 2024-08-22 NOTE — ASSESSMENT & PLAN NOTE
Lipid abnormalities are stable    Plan:  Continue same medication/s without change.      Discussed medication dosage, use, side effects, and goals of treatment in detail.    Counseled patient on lifestyle modifications to help control hyperlipidemia.     Patient Treatment Goals:   LDL goal is less than 70    Followup in 6 months  POOR CONTROL, PATIENT CONTINUES TO DECLINE TREATMENT .

## 2024-09-04 ENCOUNTER — HOSPITAL ENCOUNTER (OUTPATIENT)
Dept: BONE DENSITY | Facility: HOSPITAL | Age: 69
Discharge: HOME | End: 2024-09-04
Admitting: FAMILY MEDICINE
Payer: MEDICARE

## 2024-09-04 DIAGNOSIS — Z78.0 POSTMENOPAUSAL STATE: ICD-10-CM

## 2024-09-04 PROCEDURE — 77080 DXA BONE DENSITY AXIAL: CPT

## 2024-09-06 RX ORDER — ALENDRONATE SODIUM 70 MG/1
70 TABLET ORAL
Qty: 13 TABLET | Refills: 3 | Status: SHIPPED | OUTPATIENT
Start: 2024-09-06

## 2024-11-05 ENCOUNTER — CLINICAL SUPPORT (OUTPATIENT)
Dept: FAMILY MEDICINE CLINIC | Age: 69
End: 2024-11-05
Payer: MEDICARE

## 2024-11-05 DIAGNOSIS — Z23 IMMUNIZATION DUE: Primary | ICD-10-CM

## 2024-11-05 PROCEDURE — 90662 IIV NO PRSV INCREASED AG IM: CPT | Performed by: FAMILY MEDICINE

## 2024-11-05 PROCEDURE — G0008 ADMIN INFLUENZA VIRUS VAC: HCPCS | Performed by: FAMILY MEDICINE

## 2025-01-17 ENCOUNTER — OFFICE VISIT (OUTPATIENT)
Dept: FAMILY MEDICINE CLINIC | Age: 70
End: 2025-01-17
Payer: MEDICARE

## 2025-01-17 VITALS
WEIGHT: 146.4 LBS | OXYGEN SATURATION: 96 % | HEIGHT: 67 IN | SYSTOLIC BLOOD PRESSURE: 105 MMHG | BODY MASS INDEX: 22.98 KG/M2 | HEART RATE: 92 BPM | DIASTOLIC BLOOD PRESSURE: 71 MMHG | TEMPERATURE: 98.2 F

## 2025-01-17 DIAGNOSIS — J06.9 ACUTE URI: Primary | ICD-10-CM

## 2025-01-17 DIAGNOSIS — J02.9 SORE THROAT: ICD-10-CM

## 2025-01-17 PROCEDURE — 99213 OFFICE O/P EST LOW 20 MIN: CPT

## 2025-01-17 PROCEDURE — 87880 STREP A ASSAY W/OPTIC: CPT

## 2025-01-17 PROCEDURE — 1126F AMNT PAIN NOTED NONE PRSNT: CPT

## 2025-01-17 PROCEDURE — 1159F MED LIST DOCD IN RCRD: CPT

## 2025-01-17 PROCEDURE — 87428 SARSCOV & INF VIR A&B AG IA: CPT

## 2025-01-17 PROCEDURE — 87081 CULTURE SCREEN ONLY: CPT

## 2025-01-17 PROCEDURE — 1160F RVW MEDS BY RX/DR IN RCRD: CPT

## 2025-01-17 NOTE — PROGRESS NOTES
Subjective     CHIEF COMPLAINT    Chief Complaint   Patient presents with    Cough     X 2-3 days.    Nasal Congestion    Sore Throat       History of Present Illness:  Lori Noe is a 69 y.o. female who presents to [facility] with acute complaint of congestion, cough, sore throat. Notes some shortness of breath. Nonsmoker, does vape.  has also been sick with no specific illness. Not taking anything at home for it. Symptoms have been present for a few days.         Review of Systems   Constitutional:  Positive for chills. Negative for fever.   HENT:  Positive for congestion and sore throat.    Respiratory:  Positive for cough and shortness of breath. Negative for wheezing.    Cardiovascular:  Negative for chest pain.         Past Medical History:   Diagnosis Date    Ingrown toenail     Skin cancer          Past Surgical History:   Procedure Laterality Date     SECTION      x 2    FOOT FRACTURE SURGERY Left     age 28    HYSTERECTOMY  age 36    one ovary remains    SALIVARY GLAND EXCISION Left     SKIN CANCER EXCISION      WRIST FRACTURE SURGERY Bilateral     age 28         Family History   Problem Relation Age of Onset    Lung cancer Mother     Breast cancer Mother          Social History     Socioeconomic History    Marital status:    Tobacco Use    Smoking status: Former     Current packs/day: 0.00     Average packs/day: 1 pack/day for 22.0 years (22.0 ttl pk-yrs)     Types: Cigarettes     Start date: 1991     Quit date: 2013     Years since quittin.9    Smokeless tobacco: Never   Vaping Use    Vaping status: Every Day    Substances: Nicotine, Flavoring    Devices: Refillable tank   Substance and Sexual Activity    Alcohol use: Not Currently    Drug use: Never    Sexual activity: Defer         Allergies   Allergen Reactions    Motrin [Ibuprofen] GI Intolerance          Current Outpatient Medications on File Prior to Visit   Medication  "Sig Dispense Refill    acetaminophen (TYLENOL) 500 MG tablet Take 1 tablet by mouth Every Morning.      cetirizine (zyrTEC) 10 MG tablet Take 1 tablet by mouth Daily As Needed.      fluticasone (Flonase Sensimist) 27.5 MCG/SPRAY nasal spray Flonase Sensimist 27.5 mcg/actuation nasal spray,suspension inhale 1 puff by nasal route daily   Active      guaiFENesin (Mucinex) 600 MG 12 hr tablet Take 2 tablets by mouth 2 (Two) Times a Day. (Patient taking differently: Take 2 tablets by mouth As Needed.) 20 tablet 1    alendronate (Fosamax) 70 MG tablet Take 1 tablet by mouth Every 7 (Seven) Days. (Patient not taking: Reported on 1/17/2025) 13 tablet 3    Unable to find 1 each 1 (One) Time. Rohto Med Name 1 drop in each eye every day       No current facility-administered medications on file prior to visit.          /71   Pulse 92   Temp 98.2 °F (36.8 °C) (Oral)   Ht 170.2 cm (67.01\")   Wt 66.4 kg (146 lb 6.4 oz)   SpO2 96% Comment: room air  BMI 22.92 kg/m²       Objective     Physical Exam  Vitals and nursing note reviewed.   Constitutional:       General: She is not in acute distress.     Appearance: Normal appearance. She is not ill-appearing.   HENT:      Head: Normocephalic.      Right Ear: Tympanic membrane, ear canal and external ear normal.      Left Ear: Tympanic membrane, ear canal and external ear normal.      Nose: Nose normal.      Right Sinus: No maxillary sinus tenderness or frontal sinus tenderness.      Left Sinus: No maxillary sinus tenderness or frontal sinus tenderness.      Mouth/Throat:      Lips: Pink.      Mouth: Mucous membranes are moist.      Pharynx: Oropharynx is clear. Uvula midline. No pharyngeal swelling, oropharyngeal exudate, posterior oropharyngeal erythema or uvula swelling.   Eyes:      Pupils: Pupils are equal, round, and reactive to light.   Cardiovascular:      Rate and Rhythm: Normal rate and regular rhythm.      Heart sounds: Normal heart sounds. No murmur " heard.  Pulmonary:      Effort: Pulmonary effort is normal. No accessory muscle usage or respiratory distress.      Breath sounds: Normal breath sounds. No wheezing or rhonchi.   Musculoskeletal:      Cervical back: Normal range of motion.   Lymphadenopathy:      Cervical: No cervical adenopathy.   Skin:     General: Skin is warm and dry.   Neurological:      General: No focal deficit present.      Mental Status: She is alert and oriented to person, place, and time.   Psychiatric:         Mood and Affect: Mood and affect normal.         Behavior: Behavior normal.         Assessment & Plan  Acute URI  Neg rapid covid, flu and strep. Strep culture sent and pending. Will treat accordingly. Declines prescription cough medication. No evidence of bacterial infection on exam today.  Suspect viral illness.  Recommend symptomatic treatment which was discussed with patient.  Recommend staying well-hydrated, OTC analgesics for pain/fever.  Consider use of humidifier in the bedroom at night.  Return to clinic if symptoms worsen or fail to improve.         Sore throat    Orders:    POCT SARS-CoV-2 Antigen GINA + Flu    POCT rapid strep A    Beta Strep Culture, Throat - , Throat; Future    Beta Strep Culture, Throat - Swab, Throat           Follow up:  Return if symptoms worsen or fail to improve.  Patient was given instructions and counseling regarding her condition or for health maintenance advice. Please see specific information pulled into the AVS if appropriate.

## 2025-01-19 LAB — BACTERIA SPEC AEROBE CULT: NORMAL

## 2025-02-12 ENCOUNTER — OFFICE VISIT (OUTPATIENT)
Dept: FAMILY MEDICINE CLINIC | Age: 70
End: 2025-02-12
Payer: MEDICARE

## 2025-02-12 VITALS
HEIGHT: 67 IN | SYSTOLIC BLOOD PRESSURE: 117 MMHG | TEMPERATURE: 97.6 F | OXYGEN SATURATION: 98 % | BODY MASS INDEX: 22.91 KG/M2 | DIASTOLIC BLOOD PRESSURE: 61 MMHG | HEART RATE: 83 BPM | WEIGHT: 146 LBS

## 2025-02-12 DIAGNOSIS — R00.2 PALPITATIONS: ICD-10-CM

## 2025-02-12 DIAGNOSIS — Z87.891 HISTORY OF NICOTINE DEPENDENCE: ICD-10-CM

## 2025-02-12 DIAGNOSIS — E04.1 NODULE OF LEFT LOBE OF THYROID GLAND: ICD-10-CM

## 2025-02-12 DIAGNOSIS — M81.0 POSTMENOPAUSAL OSTEOPOROSIS: ICD-10-CM

## 2025-02-12 DIAGNOSIS — Z00.00 MEDICARE ANNUAL WELLNESS VISIT, SUBSEQUENT: Primary | ICD-10-CM

## 2025-02-12 DIAGNOSIS — E78.00 HIGH CHOLESTEROL: ICD-10-CM

## 2025-02-12 PROCEDURE — 1126F AMNT PAIN NOTED NONE PRSNT: CPT | Performed by: FAMILY MEDICINE

## 2025-02-12 PROCEDURE — G2211 COMPLEX E/M VISIT ADD ON: HCPCS | Performed by: FAMILY MEDICINE

## 2025-02-12 PROCEDURE — 1159F MED LIST DOCD IN RCRD: CPT | Performed by: FAMILY MEDICINE

## 2025-02-12 PROCEDURE — 99214 OFFICE O/P EST MOD 30 MIN: CPT | Performed by: FAMILY MEDICINE

## 2025-02-12 PROCEDURE — G0439 PPPS, SUBSEQ VISIT: HCPCS | Performed by: FAMILY MEDICINE

## 2025-02-12 PROCEDURE — G0296 VISIT TO DETERM LDCT ELIG: HCPCS | Performed by: FAMILY MEDICINE

## 2025-02-12 PROCEDURE — 1170F FXNL STATUS ASSESSED: CPT | Performed by: FAMILY MEDICINE

## 2025-02-12 PROCEDURE — 1160F RVW MEDS BY RX/DR IN RCRD: CPT | Performed by: FAMILY MEDICINE

## 2025-02-12 NOTE — ASSESSMENT & PLAN NOTE
Lipid abnormalities are stable    Plan:  Continue same medication/s without change.      Discussed medication dosage, use, side effects, and goals of treatment in detail.    Counseled patient on lifestyle modifications to help control hyperlipidemia.     Patient Treatment Goals:   LDL goal is less than 70    Followup in 6 months  NOT AT GOAL BUT DECLINES MEDS .

## 2025-02-12 NOTE — PROGRESS NOTES
Subjective   The ABCs of the Annual Wellness Visit  Medicare Wellness Visit      Lori Noe is a 69 y.o. patient who presents for a Medicare Wellness Visit.    The following portions of the patient's history were reviewed and   updated as appropriate: allergies, current medications, past family history, past medical history, past social history, past surgical history, and problem list.    Compared to one year ago, the patient's physical   health is the same.  Compared to one year ago, the patient's mental   health is the same.    Recent Hospitalizations:  She was not admitted to the hospital during the last year.     Current Medical Providers:  Patient Care Team:  Manuel Mcdaniel MD as PCP - General (Family Medicine)    Outpatient Medications Prior to Visit   Medication Sig Dispense Refill    acetaminophen (TYLENOL) 500 MG tablet Take 1 tablet by mouth Every Morning.      cetirizine (zyrTEC) 10 MG tablet Take 1 tablet by mouth Daily As Needed.      fluticasone (Flonase Sensimist) 27.5 MCG/SPRAY nasal spray Flonase Sensimist 27.5 mcg/actuation nasal spray,suspension inhale 1 puff by nasal route daily   Active      guaiFENesin (Mucinex) 600 MG 12 hr tablet Take 2 tablets by mouth 2 (Two) Times a Day. (Patient taking differently: Take 2 tablets by mouth As Needed.) 20 tablet 1    Unable to find 1 each 1 (One) Time. Rohto Med Name 1 drop in each eye every day      alendronate (Fosamax) 70 MG tablet Take 1 tablet by mouth Every 7 (Seven) Days. (Patient not taking: Reported on 2/12/2025) 13 tablet 3     No facility-administered medications prior to visit.     No opioid medication identified on active medication list. I have reviewed chart for other potential  high risk medication/s and harmful drug interactions in the elderly.      Aspirin is not on active medication list.  Aspirin use is not indicated based on review of current medical condition/s. Risk of harm outweighs potential benefits.   ".    Patient Active Problem List   Diagnosis    Other allergic rhinitis    High cholesterol (declines medication)     Palpitations (occasional, brief, negative cadiac workup in the past)    Carpal tunnel syndrome, bilateral    Colonoscopy declined (negative Cologard in )    Mammogram declined    Subcutaneous nodule of right upper extremity    Nodule of left lobe of thyroid gland    Further Covid vaccinations declined by patient    Medicare annual wellness visit, subsequent    Vapes nicotine containing substance    Postmenopausal osteoporosis (declines meds)     Advance Care Planning Advance Directive is not on file.  ACP discussion was held with the patient during this visit. Patient does not have an advance directive, information provided.            Objective   Vitals:    25 1341   BP: 117/61   BP Location: Left arm   Patient Position: Sitting   Pulse: 83   Temp: 97.6 °F (36.4 °C)   TempSrc: Oral   SpO2: 98%  Comment: on room air   Weight: 66.2 kg (146 lb)   Height: 170.2 cm (67\")   PainSc: 0-No pain       Estimated body mass index is 22.87 kg/m² as calculated from the following:    Height as of this encounter: 170.2 cm (67\").    Weight as of this encounter: 66.2 kg (146 lb).    BMI is within normal parameters. No other follow-up for BMI required.           Does the patient have evidence of cognitive impairment? No                                                                                                Health  Risk Assessment    Smoking Status:  Social History     Tobacco Use   Smoking Status Former    Current packs/day: 0.00    Average packs/day: 1 pack/day for 22.0 years (22.0 ttl pk-yrs)    Types: Cigarettes    Start date: 1991    Quit date: 2013    Years since quittin.0   Smokeless Tobacco Never     Alcohol Consumption:  Social History     Substance and Sexual Activity   Alcohol Use Not Currently       Fall Risk Screen  STEADI Fall Risk Assessment was completed, and patient is at " LOW risk for falls.Assessment completed on:2025    Depression Screening   Little interest or pleasure in doing things? Not at all   Feeling down, depressed, or hopeless? Not at all   PHQ-2 Total Score 0      Health Habits and Functional and Cognitive Screenin/12/2025     1:39 PM   Functional & Cognitive Status   Do you have difficulty preparing food and eating? No   Do you have difficulty bathing yourself, getting dressed or grooming yourself? No   Do you have difficulty using the toilet? No   Do you have difficulty moving around from place to place? No   Do you have trouble with steps or getting out of a bed or a chair? No   Current Diet Other   Dental Exam Up to date   Eye Exam Up to date   Exercise (times per week) 7 times per week   Current Exercises Include House Cleaning;Walking;Other   Do you need help using the phone?  No   Are you deaf or do you have serious difficulty hearing?  No   Do you need help to go to places out of walking distance? No   Do you need help shopping? No   Do you need help preparing meals?  No   Do you need help with housework?  No   Do you need help with laundry? No   Do you need help taking your medications? No   Do you need help managing money? No   Do you ever drive or ride in a car without wearing a seat belt? No   Have you felt unusual stress, anger or loneliness in the last month? Yes   Who do you live with? Spouse   If you need help, do you have trouble finding someone available to you? No   Have you been bothered in the last four weeks by sexual problems? No   Do you have difficulty concentrating, remembering or making decisions? No           Age-appropriate Screening Schedule:  Refer to the list below for future screening recommendations based on patient's age, sex and/or medical conditions. Orders for these recommended tests are listed in the plan section. The patient has been provided with a written plan.    Health Maintenance List  Health Maintenance   Topic  Date Due    ZOSTER VACCINE (1 of 2) Never done    LUNG CANCER SCREENING  02/26/2025    COVID-19 Vaccine (4 - 2024-25 season) 02/14/2025 (Originally 9/1/2024)    MAMMOGRAM  08/22/2025 (Originally 9/11/2021)    LIPID PANEL  08/22/2025    ANNUAL WELLNESS VISIT  02/12/2026    COLORECTAL CANCER SCREENING  02/14/2026    DXA SCAN  09/04/2026    TDAP/TD VACCINES (2 - Tdap) 12/13/2033    HEPATITIS C SCREENING  Completed    INFLUENZA VACCINE  Completed    Pneumococcal Vaccine 50+  Completed                                                                                                                                                CMS Preventative Services Quick Reference  Risk Factors Identified During Encounter  Tobacco Use/Dependance Risk (use dotphrase .tobaccocessation for documentation)    The above risks/problems have been discussed with the patient.  Pertinent information has been shared with the patient in the After Visit Summary.  An After Visit Summary and PPPS were made available to the patient.    Follow Up:   Next Medicare Wellness visit to be scheduled in 1 year.         Additional E&M Note during same encounter follows:  Patient has additional, significant, and separately identifiable condition(s)/problem(s) that require work above and beyond the Medicare Wellness Visit     Chief Complaint  Medicare Wellness-subsequent    Subjective   --LAST CHOL  WITH AN LDL , HAS CONSISTENTLY DECLINED MEDS  --A DEXA SCAN SHOWED SOME OSTEOPOROSIS BUT SHE DECLINES MEDS FOR THIS AS WELL  --DUE FOR A REPEAT THYROID U/S AND SCREENING CHEST CT  --NO RECENT PALPITATIONS          Review of Systems   Constitutional:  Negative for activity change, appetite change, chills, fatigue and fever.   HENT:  Negative for congestion, ear pain, hearing loss, rhinorrhea and sore throat.    Eyes:  Negative for discharge and visual disturbance.   Respiratory:  Negative for cough and shortness of breath.    Cardiovascular:  Negative for  "chest pain, palpitations and leg swelling.   Gastrointestinal:  Negative for abdominal pain, diarrhea, nausea and vomiting.   Genitourinary:  Negative for dysuria and hematuria.   Musculoskeletal:  Negative for arthralgias and myalgias.   Psychiatric/Behavioral:  Negative for dysphoric mood.               Objective   Vital Signs:  /61 (BP Location: Left arm, Patient Position: Sitting)   Pulse 83   Temp 97.6 °F (36.4 °C) (Oral)   Ht 170.2 cm (67\")   Wt 66.2 kg (146 lb)   SpO2 98% Comment: on room air  BMI 22.87 kg/m²   Physical Exam  Vitals and nursing note reviewed.   Constitutional:       General: She is not in acute distress.     Appearance: Normal appearance.   HENT:      Right Ear: Tympanic membrane normal.      Left Ear: Tympanic membrane normal.      Mouth/Throat:      Pharynx: Oropharynx is clear.   Eyes:      Conjunctiva/sclera: Conjunctivae normal.   Cardiovascular:      Rate and Rhythm: Normal rate and regular rhythm.      Heart sounds: Normal heart sounds. No murmur heard.  Pulmonary:      Effort: Pulmonary effort is normal.      Breath sounds: Normal breath sounds.   Abdominal:      General: Bowel sounds are normal.      Palpations: Abdomen is soft.      Tenderness: There is no abdominal tenderness.   Musculoskeletal:      Cervical back: Neck supple.      Right lower leg: No edema.      Left lower leg: No edema.   Lymphadenopathy:      Cervical: No cervical adenopathy.   Neurological:      General: No focal deficit present.      Mental Status: She is alert.      Cranial Nerves: No cranial nerve deficit.      Coordination: Coordination normal.      Gait: Gait normal.   Psychiatric:         Mood and Affect: Mood normal.         Behavior: Behavior normal.                       Assessment and Plan            Medicare annual wellness visit, subsequent  ADVICE GIVEN RE:  SEATBELT USE, ALCOHOL USE, HEALTHY DIET, ROUTINE EYE AND DENTAL EXAM, ROUTINE VACCINATIONS.    DECLINES A COVID BOOSTER.  SHE " WILL CONSIDER A ZOSTER VACCINE          High cholesterol (declines medication)    Lipid abnormalities are stable    Plan:  Continue same medication/s without change.      Discussed medication dosage, use, side effects, and goals of treatment in detail.    Counseled patient on lifestyle modifications to help control hyperlipidemia.     Patient Treatment Goals:   LDL goal is less than 70    Followup in 6 months  NOT AT GOAL BUT DECLINES MEDS .         Nodule of left lobe of thyroid gland    Orders:    US Thyroid; Future    History of nicotine dependence  Discussed lung cancer screening recommendations with the patient using the Lung Cancer Screening shared decision-making tool, including benefits and risks of a low-dose lung CT scan. The patient has declined screening at this time. Will revisit at future visits as appropriate.   Orders:    CT Chest Low Dose Wo; Future    Palpitations (occasional, brief, negative cadiac workup in the past)  IMPROVED WITH CURRENT TREATMENT, WILL REEVALUATE AT NEXT VISIT          Postmenopausal osteoporosis (declines meds)  WILL REPEAT A DEXA IN TWO YEARS.                   Follow Up   Return in about 6 months (around 8/12/2025).  Patient was given instructions and counseling regarding her condition or for health maintenance advice. Please see specific information pulled into the AVS if appropriate.

## 2025-02-12 NOTE — ASSESSMENT & PLAN NOTE
ADVICE GIVEN RE:  SEATBELT USE, ALCOHOL USE, HEALTHY DIET, ROUTINE EYE AND DENTAL EXAM, ROUTINE VACCINATIONS.    DECLINES A COVID BOOSTER.  SHE WILL CONSIDER A ZOSTER VACCINE

## 2025-03-05 ENCOUNTER — HOSPITAL ENCOUNTER (OUTPATIENT)
Dept: CT IMAGING | Facility: HOSPITAL | Age: 70
Discharge: HOME | End: 2025-03-05
Payer: MEDICARE

## 2025-03-05 ENCOUNTER — HOSPITAL ENCOUNTER (OUTPATIENT)
Dept: ULTRASOUND IMAGING | Facility: HOSPITAL | Age: 70
Discharge: HOME | End: 2025-03-05
Payer: MEDICARE

## 2025-03-05 DIAGNOSIS — Z87.891 HISTORY OF NICOTINE DEPENDENCE: ICD-10-CM

## 2025-03-05 DIAGNOSIS — E04.1 NODULE OF LEFT LOBE OF THYROID GLAND: ICD-10-CM

## 2025-03-05 PROCEDURE — 76536 US EXAM OF HEAD AND NECK: CPT

## 2025-03-05 PROCEDURE — 71271 CT THORAX LUNG CANCER SCR C-: CPT

## 2025-03-09 ENCOUNTER — DOCUMENTATION (OUTPATIENT)
Dept: FAMILY MEDICINE CLINIC | Age: 70
End: 2025-03-09
Payer: MEDICARE

## 2025-03-09 DIAGNOSIS — E04.1 NODULE OF LEFT LOBE OF THYROID GLAND: Primary | ICD-10-CM

## 2025-03-10 ENCOUNTER — RESULTS FOLLOW-UP (OUTPATIENT)
Dept: CT IMAGING | Facility: HOSPITAL | Age: 70
End: 2025-03-10
Payer: MEDICARE

## 2025-03-10 NOTE — LETTER
Lori Noe  [address]  [facility] [address]    March 11, 2025     Dear Ms. Noe:    This looks the same as last year . We will repeat the same scan in one more year.     Below are the results from your recent visit:    Resulted Orders   CT Chest Low Dose Cancer Screening WO    Narrative    CT CHEST LOW DOSE CANCER SCREENING WO    Date of Exam: 3/5/2025 12:42 PM EST    Indication: 22. Smoking history    Comparison: CT chest 3/26/2024    Technique: Low dose CT imaging of the chest was performed without intravenous contrast enhancement.  Automated exposure control and iterative reconstruction methods were used.      Findings:  There are some chronic appearing changes in the apical portion of both lungs. There is a groundglass area of density measuring 8.8 mm right upper lobe on image 30 series 204 which is similar to the prior study. There also are some groundglass changes in   the right lower lobe which have been suggested. There are some chronic tree-in-bud type densities in the left upper lobe lingular region which have been noted. There is no unchanged pulmonary nodule measuring 5.4 mm left lower lobe on image 101 series   204. There are no pleural effusions.    No pathologic-appearing mediastinal lymphadenopathy is seen. There is some calcification along the root of the thoracic aorta compatible with atherosclerotic change.    Lower slices through the upper abdomen reveal no acute findings. There is suggested cyst in the lateral segment left lobe of liver which has been noted.    Visualized osseous structures do not appear unusual.      Impression    Impression:  1.Chronic appearing changes in the apical areas.  2.Groundglass changes right upper and right lower lobe which have been suggested  3.Unchanged pulmonary nodule left lower lobe  4.Chronic tree-in-bud type densities lingular area  5.Unchanged cyst left lobe liver    Recommendation:  Continue annual screening with LDCT    Lung Rads  Assessment:  Lung-RADS L2 - Benign appearance or <1% chance of malignancy.        Electronically Signed: Mono Post MD    3/9/2025 6:11 PM EDT    Workstation ID: [id number]           If you have any questions or concerns, please don't hesitate to call.         Sincerely,        Manuel Mcdaniel MD

## 2025-04-25 ENCOUNTER — HOSPITAL ENCOUNTER (OUTPATIENT)
Dept: GENERAL RADIOLOGY | Facility: HOSPITAL | Age: 70
Discharge: HOME | End: 2025-04-25
Payer: MEDICARE

## 2025-04-25 ENCOUNTER — OFFICE VISIT (OUTPATIENT)
Dept: FAMILY MEDICINE CLINIC | Age: 70
End: 2025-04-25
Payer: MEDICARE

## 2025-04-25 VITALS
HEART RATE: 74 BPM | WEIGHT: 146.2 LBS | RESPIRATION RATE: 16 BRPM | DIASTOLIC BLOOD PRESSURE: 67 MMHG | OXYGEN SATURATION: 97 % | TEMPERATURE: 98.2 F | SYSTOLIC BLOOD PRESSURE: 118 MMHG | BODY MASS INDEX: 22.95 KG/M2 | HEIGHT: 67 IN

## 2025-04-25 DIAGNOSIS — M79.671 RIGHT FOOT PAIN: Primary | ICD-10-CM

## 2025-04-25 DIAGNOSIS — M79.671 RIGHT FOOT PAIN: ICD-10-CM

## 2025-04-25 PROCEDURE — 73630 X-RAY EXAM OF FOOT: CPT

## 2025-04-25 RX ORDER — METHYLPREDNISOLONE 4 MG/1
TABLET ORAL
Qty: 21 TABLET | Refills: 0 | Status: SHIPPED | OUTPATIENT
Start: 2025-04-25

## 2025-04-25 NOTE — PROGRESS NOTES
Chief Complaint     Foot Swelling (Right foot pain, 2 weeks. ) and Foot Pain    History of Present Illness     Lori oNe is a 69 y.o. female who presents to [facility].     Patient or patient representative verbalized consent for the use of Ambient Listening during the visit with  KEYLA Fernandez for chart documentation. 2025  09:28 EDT      History of Present Illness  The patient is a 69-year-old female who presents for evaluation of right foot pain.    Two weeks ago, an injury to the right foot occurred while attempting to step over a dog gate. The initial perception was a bruise, but persistent symptoms have led to the belief that it may be more severe. The pain is described as a burning sensation that radiates from the toe to the ankle, accompanied by swelling. Pain is rated as 2 out of 10 at rest, escalating to 3 or 4 upon ambulation. Ice and elevation provide temporary relief. Tylenol is taken daily, which only partially alleviates the discomfort.     A history of a fracture in the left foot is reported, which occurred several years ago due to a misstep, resulting in four distinct fractures. No history of diabetes is reported.         History      Past Medical History:   Diagnosis Date    Ingrown toenail     Skin cancer        Past Surgical History:   Procedure Laterality Date     SECTION      x 2    FOOT FRACTURE SURGERY Left     age 28    HYSTERECTOMY  age 36    one ovary remains    SALIVARY GLAND EXCISION Left     SKIN CANCER EXCISION      WRIST FRACTURE SURGERY Bilateral     age 28       Family History   Problem Relation Age of Onset    Lung cancer Mother     Breast cancer Mother         Current Medications        Current Outpatient Medications:     acetaminophen (TYLENOL) 500 MG tablet, Take 1 tablet by mouth Every Morning., Disp: , Rfl:     cetirizine (zyrTEC) 10 MG tablet, Take 1 tablet by mouth Daily As Needed., Disp: , Rfl:     fluticasone  "(Flonase Sensimist) 27.5 MCG/SPRAY nasal spray, Flonase Sensimist 27.5 mcg/actuation nasal spray,suspension inhale 1 puff by nasal route daily   Active, Disp: , Rfl:     Unable to find, 1 each 1 (One) Time. Rohto Med Name 1 drop in each eye every day, Disp: , Rfl:     guaiFENesin (Mucinex) 600 MG 12 hr tablet, Take 2 tablets by mouth 2 (Two) Times a Day. (Patient taking differently: Take 2 tablets by mouth As Needed.), Disp: 20 tablet, Rfl: 1    methylPREDNISolone (MEDROL) 4 MG dose pack, Take as directed on package instructions., Disp: 21 tablet, Rfl: 0     Allergies     Allergies   Allergen Reactions    Motrin [Ibuprofen] GI Intolerance       Social History       Social History     Social History Narrative    Not on file       Immunizations     Immunization:  Immunization History   Administered Date(s) Administered    COVID-19 (PFIZER) Purple Cap Monovalent 03/24/2021, 04/14/2021, 01/31/2022    Fluzone  >6mos 09/27/2013, 10/04/2017    Fluzone (or Fluarix & Flulaval for VFC) >6mos 11/02/2021, 11/07/2022    Fluzone High-Dose 65+YRS 11/05/2024    Fluzone High-Dose 65+yrs 11/07/2023    Influenza, Unspecified 11/07/2022    Pneumococcal Conjugate 20-Valent (PCV20) 02/01/2023    Td (TDVAX) 12/13/2023          Objective     Objective     Vital Signs:   /67 (BP Location: Left arm, Patient Position: Sitting, Cuff Size: Adult)   Pulse 74   Temp 98.2 °F (36.8 °C) (Oral)   Resp 16   Ht 170.2 cm (67.01\")   Wt 66.3 kg (146 lb 3.2 oz)   SpO2 97% Comment: room air  BMI 22.89 kg/m²       Physical Exam  Vitals and nursing note reviewed.   Constitutional:       Appearance: Normal appearance. She is normal weight.   HENT:      Head: Normocephalic.   Eyes:      Conjunctiva/sclera: Conjunctivae normal.      Pupils: Pupils are equal, round, and reactive to light.   Cardiovascular:      Rate and Rhythm: Normal rate and regular rhythm.      Pulses: Normal pulses.      Heart sounds: Normal heart sounds.   Pulmonary:      " Effort: Pulmonary effort is normal.      Breath sounds: Normal breath sounds.   Abdominal:      General: Bowel sounds are normal.      Palpations: Abdomen is soft.   Musculoskeletal:         General: Normal range of motion.      Cervical back: Normal range of motion and neck supple.      Right foot: Swelling and tenderness present.      Left foot: Normal.   Feet:      Comments: Bruising on the dorsal aspect of the right foot at the past of the third toe  Skin:     General: Skin is warm and dry.   Neurological:      General: No focal deficit present.      Mental Status: She is alert and oriented to person, place, and time.   Psychiatric:         Attention and Perception: Attention normal.         Mood and Affect: Mood and affect normal.         Behavior: Behavior normal. Behavior is cooperative.         Physical Exam  Extremities: Swelling noted in the right foot, particularly around the third toe. Pain elicited upon pressure near the area where the toes meet the foot. Patient able to wiggle all except the third toe and has limited movement when trying to spread her toes and there is pain upon this movement.        Results    The following data was reviewed by: KEYLA Fernandez on 04/25/25             XR Foot 3+ View Right (04/25/2025 09:54)   Results           Assessment and Plan        Assessment and Plan       Right foot pain    Orders:    XR Foot 3+ View Right; Future    methylPREDNISolone (MEDROL) 4 MG dose pack; Take as directed on package instructions.         Assessment & Plan  1. Pain in the right foot.  - Persistent pain in the right foot two weeks post-injury involving a dog gate. Pain described as burning and sharp, with swelling and increased pain upon walking.  - Physical examination reveals tenderness near the toes, with the third toe being the primary source of pain. The patient can wiggle her toes but experiences pain when spreading them.  - Discussion included the ineffectiveness of Tylenol in  fully alleviating pain, and the patient's allergy to ibuprofen, limiting NSAID use. An x-ray has been ordered to assess for fractures or other issues.  - Advised to continue rest, ice, and elevation. A Medrol Dosepak has been prescribed for inflammation, and the prescription has been sent to [facility]. Referral to podiatry or orthopedics will be considered based on x-ray results.        Follow Up        Follow Up   Return for With PCP, Next scheduled follow up, sooner if condition worsens.  Patient was given instructions and counseling regarding her condition or for health maintenance advice. Please see specific information pulled into the AVS if appropriate.

## 2025-04-29 DIAGNOSIS — S92.511A CLOSED FRACTURE OF PROXIMAL PHALANX OF LESSER TOE OF RIGHT FOOT, PHYSEAL INVOLVEMENT UNSPECIFIED, INITIAL ENCOUNTER: Primary | ICD-10-CM

## 2025-06-16 ENCOUNTER — OFFICE VISIT (OUTPATIENT)
Dept: OTOLARYNGOLOGY | Facility: CLINIC | Age: 70
End: 2025-06-16
Payer: MEDICARE

## 2025-06-16 ENCOUNTER — LAB (OUTPATIENT)
Dept: LAB | Facility: HOSPITAL | Age: 70
End: 2025-06-16
Payer: MEDICARE

## 2025-06-16 VITALS
TEMPERATURE: 97.2 F | HEART RATE: 77 BPM | SYSTOLIC BLOOD PRESSURE: 110 MMHG | DIASTOLIC BLOOD PRESSURE: 66 MMHG | OXYGEN SATURATION: 99 %

## 2025-06-16 DIAGNOSIS — E04.1 THYROID NODULE: Primary | ICD-10-CM

## 2025-06-16 DIAGNOSIS — R60.0 SUBMANDIBULAR GLAND SWELLING: ICD-10-CM

## 2025-06-16 DIAGNOSIS — E04.1 THYROID NODULE: ICD-10-CM

## 2025-06-16 DIAGNOSIS — K11.7 XEROSTOMIA: ICD-10-CM

## 2025-06-16 LAB
T4 FREE SERPL-MCNC: 1.29 NG/DL (ref 0.92–1.68)
TSH SERPL DL<=0.05 MIU/L-ACNC: 1.65 UIU/ML (ref 0.27–4.2)

## 2025-06-16 PROCEDURE — 84439 ASSAY OF FREE THYROXINE: CPT

## 2025-06-16 PROCEDURE — 99204 OFFICE O/P NEW MOD 45 MIN: CPT | Performed by: OTOLARYNGOLOGY

## 2025-06-16 PROCEDURE — 36415 COLL VENOUS BLD VENIPUNCTURE: CPT

## 2025-06-16 PROCEDURE — 84443 ASSAY THYROID STIM HORMONE: CPT

## 2025-06-16 NOTE — PROGRESS NOTES
Patient Name: Lori Noe   Visit Date: 2025   Patient ID: [id number]  Provider: Antonino Luis MD    Sex: female  Location: [facility]   YOB: 1955  Location Address: [address] [place] [address], [address] [place] [address],?[id number]    Primary Care Provider Manuel Mcdaniel MD  Location Phone: [phone]    Referring Provider: Manuel Mcdaniel,*        Chief Complaint  Thyroid nodule    History of Present Illness  Lori Noe is a 69 y.o. female who presents to [facility] today as a consult from Manuel Mcdaniel,* for evaluation of thyroid nodules.  She actually tells me that she has a previous history of left submandibular gland excision at [facility] in her 20s.  She tells me that there was some concern for cancer at the time but her final pathology was benign.  She does report intermittent right sided submandibular gland swelling.  Never becomes erythematous or painful.  She denies any history of sialolith.  She does report some xerostomia but denies any xerophthalmia.  She cannot recall any family history of thyroid cancer or personal history of radiation exposure.  She denies any compressive symptoms.  Thyroid ultrasound on 3/5/2025 revealed bilateral subcentimeter cysts and a left superior 1.2 x 0.94 x 1.1 cm hypoechoic mass with internal calcifications previously measuring 1.3 x 0.8 x 0.9 cm on 2023.  Thyroid function testing on 2024 revealed normal TSH of 0.966.    Past Medical History:   Diagnosis Date    Ingrown toenail     Skin cancer        Past Surgical History:   Procedure Laterality Date     SECTION      x 2    FOOT FRACTURE SURGERY Left     age 28    HYSTERECTOMY  age 36    one ovary remains    SALIVARY GLAND EXCISION Left     SKIN CANCER EXCISION      WRIST FRACTURE SURGERY Bilateral     age 28         Current Outpatient Medications:     acetaminophen (TYLENOL) 500 MG  tablet, Take 1 tablet by mouth Every Morning., Disp: , Rfl:     Artificial Tear Ointment (artificial tears) ophthalmic ointment, Administer 1 Application to both eyes Daily., Disp: , Rfl:     cetirizine (zyrTEC) 10 MG tablet, Take 1 tablet by mouth Daily As Needed., Disp: , Rfl:     fluticasone (Flonase Sensimist) 27.5 MCG/SPRAY nasal spray, Flonase Sensimist 27.5 mcg/actuation nasal spray,suspension inhale 1 puff by nasal route daily   Active, Disp: , Rfl:     guaiFENesin (Mucinex) 600 MG 12 hr tablet, Take 2 tablets by mouth 2 (Two) Times a Day. (Patient taking differently: Take 2 tablets by mouth As Needed.), Disp: 20 tablet, Rfl: 1    Unable to find, 1 each 1 (One) Time. Rohto Med Name 1 drop in each eye every day, Disp: , Rfl:      Allergies   Allergen Reactions    Motrin [Ibuprofen] GI Intolerance       Social History     Tobacco Use    Smoking status: Former     Current packs/day: 0.00     Average packs/day: 1 pack/day for 22.0 years (22.0 ttl pk-yrs)     Types: Cigarettes     Start date: 1991     Quit date: 2013     Years since quittin.3    Smokeless tobacco: Never   Vaping Use    Vaping status: Every Day    Substances: Nicotine, Flavoring    Devices: Refillable tank   Substance Use Topics    Alcohol use: Not Currently    Drug use: Never        Objective     Vital Signs:   /66   Pulse 77   Temp 97.2 °F (36.2 °C)   SpO2 99%       Physical Exam    General: Well developed, well nourished patient of stated age in no acute distress. Voice is strong and clear.   Head: Normocephalic and atraumatic.  Face: No lesions.  Bilateral parotids and right submandibular gland are unremarkable.  Left submandibular gland is surgically absent.  Overlying scar present. Stensen's and Warthin's ducts are productive of clear saliva bilaterally.  House-Brackmann I/VI bilaterally.   muscles and temporomandibular joint nontender to palpation.  No TMJ crepitus.  Eyes: PERRLA, sclerae anicteric, no  conjunctival injection. Extraocular movements are intact and full. No nystagmus.   Ears: Auricles are normal in appearance. Bilateral external auditory canals are unremarkable. Bilateral tympanic membranes are clear and without effusion. Hearing normal to conversational voice.   Nose: External nose is normal in appearance. Bilateral nares are patent with normal appearing mucosa. Septum midline. Turbinates are unremarkable. No lesions.   Oral Cavity: Lips are normal in appearance. Oral mucosa is unremarkable. Gingiva is unremarkable.  Partial dentition for age. Tongue is unremarkable with good movement. Hard palate is unremarkable.   Oropharynx: Soft palate is unremarkable with full movement. Uvula is unremarkable. Bilateral tonsils are unremarkable. Posterior oropharynx is unremarkable.    Larynx and hypopharynx: Deferred secondary to gag reflex.  Neck: Supple.  No mass.  Nontender to palpation.  Trachea midline. Thyroid normal size and without nodules to palpation.   Lymphatic: No lymphadenopathy upon palpation.  Respiratory: Clear to auscultation bilaterally, nonlabored respirations    Cardiovascular: RRR, no murmurs, rubs, or gallops,   Psychiatric: Appropriate affect, cooperative   Neurologic: Oriented x 3, strength symmetric in all extremities, Cranial Nerves II-XII are grossly intact to confrontation   Skin: Warm and dry. No rashes.    Procedures           Result Review :               Assessment and Plan    Diagnoses and all orders for this visit:    1. Thyroid nodule (Primary)  -     TSH+Free T4; Future  -     US Thyroid; Future    2. Submandibular gland swelling    3. Xerostomia    Impressions and findings were discussed at great length.  Currently, she is seen for evaluation of a left-sided thyroid nodule which was initially noted on an ultrasound on 1/17/2020.  This appeared more cystic and measured 3.2 cm at the time.  We reviewed and discussed the images from her 2/14/2023 and 3/5/2025 thyroid  ultrasound which revealed a relatively stable left superior 1.2 cm hypoechoic nodule with possible small areas of cystic fluid and a macrocalcification.  We discussed that this was a graded TI-RADS 5 and the typical recommendation would be for an ultrasound-guided FNA.  However, given its stability in appearance we also discussed the possibility of continued observation with a repeat ultrasound in 1 year.  After thorough discussion she would like to avoid any needle biopsy if at all possible and has elected to pursue surveillance.  We will also check her thyroid function.  She reports some difficulty with her sleep.  We also discussed that it sounds as though she is experiencing intermittent right-sided submandibular gland swelling.  There is no evidence of sialolith today.  We discussed conservative measures such as warm compress, massage, sialagogues use, and adequate hydration.  She was given ample time to ask questions, all of which were answered to her satisfaction.        Follow Up   Return in about 1 year (around 6/16/2026).  Patient was given instructions and counseling regarding her condition or for health maintenance advice. Please see specific information pulled into the AVS if appropriate.

## 2025-06-19 ENCOUNTER — PATIENT ROUNDING (BHMG ONLY) (OUTPATIENT)
Dept: OTOLARYNGOLOGY | Facility: CLINIC | Age: 70
End: 2025-06-19
Payer: MEDICARE

## 2025-06-19 NOTE — PROGRESS NOTES
A Kypha message has been send to the patient for PATIENT ROUNDING with [facility].

## 2025-07-08 ENCOUNTER — OFFICE VISIT (OUTPATIENT)
Dept: FAMILY MEDICINE CLINIC | Age: 70
End: 2025-07-08
Payer: MEDICARE

## 2025-07-08 VITALS
HEIGHT: 67 IN | OXYGEN SATURATION: 97 % | WEIGHT: 143.5 LBS | DIASTOLIC BLOOD PRESSURE: 73 MMHG | BODY MASS INDEX: 22.52 KG/M2 | HEART RATE: 67 BPM | TEMPERATURE: 98 F | SYSTOLIC BLOOD PRESSURE: 125 MMHG | RESPIRATION RATE: 16 BRPM

## 2025-07-08 DIAGNOSIS — M54.50 ACUTE RIGHT-SIDED LOW BACK PAIN WITHOUT SCIATICA: Primary | ICD-10-CM

## 2025-07-08 PROCEDURE — 1125F AMNT PAIN NOTED PAIN PRSNT: CPT | Performed by: STUDENT IN AN ORGANIZED HEALTH CARE EDUCATION/TRAINING PROGRAM

## 2025-07-08 PROCEDURE — 99213 OFFICE O/P EST LOW 20 MIN: CPT | Performed by: STUDENT IN AN ORGANIZED HEALTH CARE EDUCATION/TRAINING PROGRAM

## 2025-07-08 RX ORDER — CYCLOBENZAPRINE HCL 5 MG
5 TABLET ORAL 3 TIMES DAILY PRN
Qty: 30 TABLET | Refills: 0 | Status: SHIPPED | OUTPATIENT
Start: 2025-07-08 | End: 2025-07-18

## 2025-07-08 NOTE — PROGRESS NOTES
Chief Complaint     Back Pain (3 days )    History of Present Illness     Lori Noe is a 69 y.o. female who presents to [facility].     Patient or patient representative verbalized consent for the use of Ambient Listening during the visit with  KEYLA Fernandez for chart documentation. 2025  15:58 EDT      History of Present Illness  The patient is a 69-year-old female who presents for evaluation of back pain.    She experienced an incident while cleaning her bathtub on Saturday, during which she felt a sudden twist in her back. Initially, she believed the condition was improving, but upon waking up this morning, she found it difficult to stand or sit due to the pain. The discomfort is localized to the lower right side of her back, where she describes it as a stabbing sensation, akin to nerve or muscle pain. She rates the intensity of the pain as 6 out of 10.    She recalls a similar incident approximately 6 years ago when she was moving items in her closet and experienced a similar twisting motion. At that time, she was prescribed a muscle relaxer.         History      Past Medical History:   Diagnosis Date    Ingrown toenail     Skin cancer        Past Surgical History:   Procedure Laterality Date     SECTION      x 2    FOOT FRACTURE SURGERY Left     age 28    HYSTERECTOMY  age 36    one ovary remains    SALIVARY GLAND EXCISION Left     SKIN CANCER EXCISION      WRIST FRACTURE SURGERY Bilateral     age 28       Family History   Problem Relation Age of Onset    Lung cancer Mother     Breast cancer Mother         Current Medications        Current Outpatient Medications:     acetaminophen (TYLENOL) 500 MG tablet, Take 1 tablet by mouth Every Morning., Disp: , Rfl:     Artificial Tear Ointment (artificial tears) ophthalmic ointment, Administer 1 Application to both eyes Daily., Disp: , Rfl:     cetirizine (zyrTEC) 10 MG tablet, Take 1 tablet by mouth Daily  "As Needed., Disp: , Rfl:     fluticasone (Flonase Sensimist) 27.5 MCG/SPRAY nasal spray, Flonase Sensimist 27.5 mcg/actuation nasal spray,suspension inhale 1 puff by nasal route daily   Active, Disp: , Rfl:     guaiFENesin (Mucinex) 600 MG 12 hr tablet, Take 2 tablets by mouth 2 (Two) Times a Day. (Patient taking differently: Take 2 tablets by mouth As Needed.), Disp: 20 tablet, Rfl: 1    Unable to find, 1 each 1 (One) Time. Rohto Med Name 1 drop in each eye every day, Disp: , Rfl:     cyclobenzaprine (FLEXERIL) 5 MG tablet, Take 1 tablet by mouth 3 (Three) Times a Day As Needed for Muscle Spasms for up to 10 days. Avoid with driving. Do not use alcohol with this prescription, Disp: 30 tablet, Rfl: 0     Allergies     Allergies   Allergen Reactions    Motrin [Ibuprofen] GI Intolerance       Social History       Social History     Social History Narrative    Not on file       Immunizations     Immunization:  Immunization History   Administered Date(s) Administered    COVID-19 (PFIZER) Purple Cap Monovalent 03/24/2021, 04/14/2021, 01/31/2022    Fluzone  >6mos 09/27/2013, 10/04/2017    Fluzone (or Fluarix & Flulaval for VFC) >6mos 11/02/2021, 11/07/2022    Fluzone High-Dose 65+YRS 11/05/2024    Fluzone High-Dose 65+yrs 11/07/2023    Influenza, Unspecified 11/07/2022    Pneumococcal Conjugate 20-Valent (PCV20) 02/01/2023    Td (TDVAX) 12/13/2023          Objective     Objective     Vital Signs:   /73 (BP Location: Left arm, Patient Position: Sitting, Cuff Size: Adult)   Pulse 67   Temp 98 °F (36.7 °C) (Oral)   Resp 16   Ht 170.2 cm (67.01\")   Wt 65.1 kg (143 lb 8 oz)   SpO2 97%   BMI 22.47 kg/m²       Physical Exam  Vitals and nursing note reviewed.   Constitutional:       Appearance: Normal appearance. She is normal weight.   HENT:      Head: Normocephalic.   Eyes:      Conjunctiva/sclera: Conjunctivae normal.      Pupils: Pupils are equal, round, and reactive to light.   Cardiovascular:      Rate and " Rhythm: Normal rate and regular rhythm.      Pulses: Normal pulses.      Heart sounds: Normal heart sounds.   Pulmonary:      Effort: Pulmonary effort is normal.      Breath sounds: Normal breath sounds.   Abdominal:      General: Bowel sounds are normal.      Palpations: Abdomen is soft.   Musculoskeletal:         General: Normal range of motion.      Cervical back: Normal range of motion and neck supple.        Back:    Skin:     General: Skin is warm and dry.   Neurological:      General: No focal deficit present.      Mental Status: She is alert and oriented to person, place, and time.   Psychiatric:         Attention and Perception: Attention normal.         Mood and Affect: Mood and affect normal.         Behavior: Behavior normal.         Physical Exam  Respiratory: Clear to auscultation, no wheezing, rales or rhonchi  Musculoskeletal: Tenderness noted in the right lower back       Results    The following data was reviewed by: KEYLA Fernandez on 07/08/25               Results           Assessment and Plan        Assessment and Plan       Acute right-sided low back pain without sciatica    Orders:    cyclobenzaprine (FLEXERIL) 5 MG tablet; Take 1 tablet by mouth 3 (Three) Times a Day As Needed for Muscle Spasms for up to 10 days. Avoid with driving. Do not use alcohol with this prescription         Assessment & Plan  1. Back pain.  - Reports experiencing lower right back pain since 07/05/2025 after cleaning the bathtub. Pain is described as a stabbing sensation, rated at 6/10, and localized to the lower right back.  - Physical exam confirms pain in the lower right back dimple area. No other abnormalities noted.  - Discussed history of a similar injury approximately 6 years ago. Allergic to ibuprofen. Advised on the potential drowsiness caused by muscle relaxers and the importance of a safe home environment.  - Prescribed Flexeril 5 mg, to be taken up to three times daily. Recommended over-the-counter  Tylenol, heat application, ice packs, and topical patches or creams for additional relief. Advised rest with gradual resumption of activity as tolerated. Follow-up with Dr. Aguilar in 08/2025.        Follow Up        Follow Up   Return for With PCP, Next scheduled follow up, sooner if condition worsens.  Patient was given instructions and counseling regarding her condition or for health maintenance advice. Please see specific information pulled into the AVS if appropriate.

## 2025-08-13 ENCOUNTER — OFFICE VISIT (OUTPATIENT)
Dept: FAMILY MEDICINE CLINIC | Age: 70
End: 2025-08-13
Payer: MEDICARE

## 2025-08-13 VITALS
HEART RATE: 80 BPM | WEIGHT: 145 LBS | DIASTOLIC BLOOD PRESSURE: 79 MMHG | SYSTOLIC BLOOD PRESSURE: 122 MMHG | BODY MASS INDEX: 22.76 KG/M2 | OXYGEN SATURATION: 97 % | TEMPERATURE: 98.4 F | HEIGHT: 67 IN

## 2025-08-13 DIAGNOSIS — J30.89 OTHER ALLERGIC RHINITIS: ICD-10-CM

## 2025-08-13 DIAGNOSIS — E78.00 HIGH CHOLESTEROL: Primary | ICD-10-CM

## 2025-08-13 DIAGNOSIS — E04.1 NODULE OF LEFT LOBE OF THYROID GLAND: ICD-10-CM

## 2025-08-13 PROBLEM — Z00.00 MEDICARE ANNUAL WELLNESS VISIT, SUBSEQUENT: Status: RESOLVED | Noted: 2024-02-07 | Resolved: 2025-08-13
